# Patient Record
Sex: MALE | Race: WHITE | Employment: OTHER | URBAN - METROPOLITAN AREA
[De-identification: names, ages, dates, MRNs, and addresses within clinical notes are randomized per-mention and may not be internally consistent; named-entity substitution may affect disease eponyms.]

---

## 2022-07-21 ENCOUNTER — APPOINTMENT (OUTPATIENT)
Dept: GENERAL RADIOLOGY | Age: 87
DRG: 291 | End: 2022-07-21
Payer: MEDICARE

## 2022-07-21 ENCOUNTER — APPOINTMENT (OUTPATIENT)
Dept: CT IMAGING | Age: 87
DRG: 291 | End: 2022-07-21
Payer: MEDICARE

## 2022-07-21 ENCOUNTER — HOSPITAL ENCOUNTER (INPATIENT)
Age: 87
LOS: 7 days | Discharge: NURSING, REHAB OR LONG-TERM CARE | DRG: 291 | End: 2022-07-28
Attending: EMERGENCY MEDICINE | Admitting: INTERNAL MEDICINE
Payer: MEDICARE

## 2022-07-21 DIAGNOSIS — I48.91 ATRIAL FIBRILLATION, UNSPECIFIED TYPE (HCC): ICD-10-CM

## 2022-07-21 DIAGNOSIS — J95.811 POSTPROCEDURAL PNEUMOTHORAX: ICD-10-CM

## 2022-07-21 DIAGNOSIS — J81.0 ACUTE PULMONARY EDEMA (HCC): Primary | ICD-10-CM

## 2022-07-21 DIAGNOSIS — J96.01 ACUTE RESPIRATORY FAILURE WITH HYPOXIA (HCC): ICD-10-CM

## 2022-07-21 DIAGNOSIS — N17.9 AKI (ACUTE KIDNEY INJURY) (HCC): ICD-10-CM

## 2022-07-21 PROBLEM — I50.31 DIASTOLIC CHF, ACUTE (HCC): Status: ACTIVE | Noted: 2022-07-21

## 2022-07-21 LAB
AADO2: 342.9 MMHG
ALBUMIN SERPL-MCNC: 3.5 G/DL (ref 3.5–5.2)
ALP BLD-CCNC: 100 U/L (ref 40–129)
ALT SERPL-CCNC: 32 U/L (ref 0–40)
ANION GAP SERPL CALCULATED.3IONS-SCNC: 14 MMOL/L (ref 7–16)
AST SERPL-CCNC: 95 U/L (ref 0–39)
B.E.: -4.2 MMOL/L (ref -3–3)
BACTERIA: ABNORMAL /HPF
BASOPHILS ABSOLUTE: 0.03 E9/L (ref 0–0.2)
BASOPHILS RELATIVE PERCENT: 0.2 % (ref 0–2)
BILIRUB SERPL-MCNC: 1.3 MG/DL (ref 0–1.2)
BILIRUBIN URINE: ABNORMAL
BLOOD, URINE: ABNORMAL
BUN BLDV-MCNC: 38 MG/DL (ref 6–23)
CALCIUM SERPL-MCNC: 8.3 MG/DL (ref 8.6–10.2)
CHLORIDE BLD-SCNC: 110 MMOL/L (ref 98–107)
CLARITY: ABNORMAL
CO2: 19 MMOL/L (ref 22–29)
COHB: 0.3 % (ref 0–1.5)
COLOR: ABNORMAL
CREAT SERPL-MCNC: 2 MG/DL (ref 0.7–1.2)
CRITICAL: ABNORMAL
DATE ANALYZED: ABNORMAL
DATE OF COLLECTION: ABNORMAL
EKG ATRIAL RATE: 96 BPM
EKG Q-T INTERVAL: 280 MS
EKG QRS DURATION: 94 MS
EKG QTC CALCULATION (BAZETT): 402 MS
EKG R AXIS: 0 DEGREES
EKG T AXIS: 153 DEGREES
EKG VENTRICULAR RATE: 124 BPM
EOSINOPHILS ABSOLUTE: 0 E9/L (ref 0.05–0.5)
EOSINOPHILS RELATIVE PERCENT: 0 % (ref 0–6)
FIO2: 100 %
GFR AFRICAN AMERICAN: 38
GFR NON-AFRICAN AMERICAN: 32 ML/MIN/1.73
GLUCOSE BLD-MCNC: 148 MG/DL (ref 74–99)
GLUCOSE URINE: NEGATIVE MG/DL
HCO3: 20.6 MMOL/L (ref 22–26)
HCT VFR BLD CALC: 44.7 % (ref 37–54)
HEMOGLOBIN: 14.1 G/DL (ref 12.5–16.5)
HHB: 0.7 % (ref 0–5)
IMMATURE GRANULOCYTES #: 0.06 E9/L
IMMATURE GRANULOCYTES %: 0.5 % (ref 0–5)
KETONES, URINE: NEGATIVE MG/DL
LAB: ABNORMAL
LACTIC ACID: 2.7 MMOL/L (ref 0.5–2.2)
LEUKOCYTE ESTERASE, URINE: NEGATIVE
LYMPHOCYTES ABSOLUTE: 0.95 E9/L (ref 1.5–4)
LYMPHOCYTES RELATIVE PERCENT: 7.1 % (ref 20–42)
Lab: ABNORMAL
MCH RBC QN AUTO: 29.6 PG (ref 26–35)
MCHC RBC AUTO-ENTMCNC: 31.5 % (ref 32–34.5)
MCV RBC AUTO: 93.7 FL (ref 80–99.9)
METHB: 0.5 % (ref 0–1.5)
MODE: ABNORMAL
MONOCYTES ABSOLUTE: 0.88 E9/L (ref 0.1–0.95)
MONOCYTES RELATIVE PERCENT: 6.6 % (ref 2–12)
NEUTROPHILS ABSOLUTE: 11.38 E9/L (ref 1.8–7.3)
NEUTROPHILS RELATIVE PERCENT: 85.6 % (ref 43–80)
NITRITE, URINE: POSITIVE
O2 CONTENT: 20 ML/DL
O2 SATURATION: 99.3 % (ref 92–98.5)
O2HB: 98.5 % (ref 94–97)
OPERATOR ID: 1119
PATIENT TEMP: 37 C
PCO2: 37.2 MMHG (ref 35–45)
PDW BLD-RTO: 16.5 FL (ref 11.5–15)
PEEP/CPAP: 5 CMH2O
PFO2: 3.08 MMHG/%
PH BLOOD GAS: 7.36 (ref 7.35–7.45)
PH UA: 5.5 (ref 5–9)
PLATELET # BLD: 276 E9/L (ref 130–450)
PMV BLD AUTO: 10 FL (ref 7–12)
PO2: 307.9 MMHG (ref 75–100)
POTASSIUM REFLEX MAGNESIUM: 5 MMOL/L (ref 3.5–5)
PRO-BNP: ABNORMAL PG/ML (ref 0–450)
PROTEIN UA: 100 MG/DL
PS: 10 CMH20
RBC # BLD: 4.77 E12/L (ref 3.8–5.8)
RBC UA: ABNORMAL /HPF (ref 0–2)
RI(T): 1.11
SARS-COV-2, NAAT: NOT DETECTED
SODIUM BLD-SCNC: 143 MMOL/L (ref 132–146)
SOURCE, BLOOD GAS: ABNORMAL
SPECIFIC GRAVITY UA: >=1.03 (ref 1–1.03)
THB: 13.9 G/DL (ref 11.5–16.5)
TIME ANALYZED: 1755
TOTAL CK: 3244 U/L (ref 20–200)
TOTAL PROTEIN: 6.4 G/DL (ref 6.4–8.3)
TROPONIN, HIGH SENSITIVITY: 192 NG/L (ref 0–11)
UROBILINOGEN, URINE: 1 E.U./DL
WBC # BLD: 13.3 E9/L (ref 4.5–11.5)
WBC UA: ABNORMAL /HPF (ref 0–5)

## 2022-07-21 PROCEDURE — 2500000003 HC RX 250 WO HCPCS: Performed by: NURSE PRACTITIONER

## 2022-07-21 PROCEDURE — 6360000002 HC RX W HCPCS: Performed by: NURSE PRACTITIONER

## 2022-07-21 PROCEDURE — 96375 TX/PRO/DX INJ NEW DRUG ADDON: CPT

## 2022-07-21 PROCEDURE — 84484 ASSAY OF TROPONIN QUANT: CPT

## 2022-07-21 PROCEDURE — 36415 COLL VENOUS BLD VENIPUNCTURE: CPT

## 2022-07-21 PROCEDURE — 6360000002 HC RX W HCPCS: Performed by: EMERGENCY MEDICINE

## 2022-07-21 PROCEDURE — 2580000003 HC RX 258: Performed by: NURSE PRACTITIONER

## 2022-07-21 PROCEDURE — 81001 URINALYSIS AUTO W/SCOPE: CPT

## 2022-07-21 PROCEDURE — 6360000002 HC RX W HCPCS: Performed by: INTERNAL MEDICINE

## 2022-07-21 PROCEDURE — 87088 URINE BACTERIA CULTURE: CPT

## 2022-07-21 PROCEDURE — 82805 BLOOD GASES W/O2 SATURATION: CPT

## 2022-07-21 PROCEDURE — 85025 COMPLETE CBC W/AUTO DIFF WBC: CPT

## 2022-07-21 PROCEDURE — 80053 COMPREHEN METABOLIC PANEL: CPT

## 2022-07-21 PROCEDURE — 94660 CPAP INITIATION&MGMT: CPT

## 2022-07-21 PROCEDURE — 82550 ASSAY OF CK (CPK): CPT

## 2022-07-21 PROCEDURE — 93005 ELECTROCARDIOGRAM TRACING: CPT | Performed by: EMERGENCY MEDICINE

## 2022-07-21 PROCEDURE — 99285 EMERGENCY DEPT VISIT HI MDM: CPT

## 2022-07-21 PROCEDURE — 2000000000 HC ICU R&B

## 2022-07-21 PROCEDURE — 70450 CT HEAD/BRAIN W/O DYE: CPT

## 2022-07-21 PROCEDURE — 99223 1ST HOSP IP/OBS HIGH 75: CPT | Performed by: INTERNAL MEDICINE

## 2022-07-21 PROCEDURE — 96365 THER/PROPH/DIAG IV INF INIT: CPT

## 2022-07-21 PROCEDURE — 87449 NOS EACH ORGANISM AG IA: CPT

## 2022-07-21 PROCEDURE — 87635 SARS-COV-2 COVID-19 AMP PRB: CPT

## 2022-07-21 PROCEDURE — 2500000003 HC RX 250 WO HCPCS: Performed by: EMERGENCY MEDICINE

## 2022-07-21 PROCEDURE — 51702 INSERT TEMP BLADDER CATH: CPT

## 2022-07-21 PROCEDURE — 83605 ASSAY OF LACTIC ACID: CPT

## 2022-07-21 PROCEDURE — 71045 X-RAY EXAM CHEST 1 VIEW: CPT

## 2022-07-21 PROCEDURE — 2580000003 HC RX 258: Performed by: INTERNAL MEDICINE

## 2022-07-21 PROCEDURE — 83880 ASSAY OF NATRIURETIC PEPTIDE: CPT

## 2022-07-21 RX ORDER — ALBUMIN (HUMAN) 12.5 G/50ML
25 SOLUTION INTRAVENOUS EVERY 8 HOURS
Status: DISCONTINUED | OUTPATIENT
Start: 2022-07-21 | End: 2022-07-21

## 2022-07-21 RX ORDER — ONDANSETRON 2 MG/ML
4 INJECTION INTRAMUSCULAR; INTRAVENOUS EVERY 6 HOURS PRN
Status: DISCONTINUED | OUTPATIENT
Start: 2022-07-21 | End: 2022-07-28 | Stop reason: HOSPADM

## 2022-07-21 RX ORDER — ACETAMINOPHEN 650 MG/1
650 SUPPOSITORY RECTAL EVERY 6 HOURS PRN
Status: DISCONTINUED | OUTPATIENT
Start: 2022-07-21 | End: 2022-07-28 | Stop reason: HOSPADM

## 2022-07-21 RX ORDER — SODIUM CHLORIDE 9 MG/ML
INJECTION, SOLUTION INTRAVENOUS PRN
Status: DISCONTINUED | OUTPATIENT
Start: 2022-07-21 | End: 2022-07-28 | Stop reason: HOSPADM

## 2022-07-21 RX ORDER — POLYETHYLENE GLYCOL 3350 17 G/17G
17 POWDER, FOR SOLUTION ORAL DAILY PRN
Status: DISCONTINUED | OUTPATIENT
Start: 2022-07-21 | End: 2022-07-28 | Stop reason: HOSPADM

## 2022-07-21 RX ORDER — DOXYCYCLINE HYCLATE 100 MG/1
100 CAPSULE ORAL EVERY 12 HOURS SCHEDULED
Status: DISCONTINUED | OUTPATIENT
Start: 2022-07-21 | End: 2022-07-21

## 2022-07-21 RX ORDER — SODIUM CHLORIDE 0.9 % (FLUSH) 0.9 %
5-40 SYRINGE (ML) INJECTION EVERY 12 HOURS SCHEDULED
Status: DISCONTINUED | OUTPATIENT
Start: 2022-07-21 | End: 2022-07-28 | Stop reason: HOSPADM

## 2022-07-21 RX ORDER — FUROSEMIDE 10 MG/ML
20 INJECTION INTRAMUSCULAR; INTRAVENOUS EVERY 12 HOURS
Status: DISCONTINUED | OUTPATIENT
Start: 2022-07-22 | End: 2022-07-24

## 2022-07-21 RX ORDER — ENOXAPARIN SODIUM 100 MG/ML
30 INJECTION SUBCUTANEOUS 2 TIMES DAILY
Status: DISCONTINUED | OUTPATIENT
Start: 2022-07-21 | End: 2022-07-22

## 2022-07-21 RX ORDER — NITROGLYCERIN 20 MG/100ML
5-200 INJECTION INTRAVENOUS CONTINUOUS
Status: DISCONTINUED | OUTPATIENT
Start: 2022-07-21 | End: 2022-07-22

## 2022-07-21 RX ORDER — SODIUM CHLORIDE 0.9 % (FLUSH) 0.9 %
5-40 SYRINGE (ML) INJECTION PRN
Status: DISCONTINUED | OUTPATIENT
Start: 2022-07-21 | End: 2022-07-28 | Stop reason: HOSPADM

## 2022-07-21 RX ORDER — FUROSEMIDE 10 MG/ML
40 INJECTION INTRAMUSCULAR; INTRAVENOUS EVERY 8 HOURS
Status: DISCONTINUED | OUTPATIENT
Start: 2022-07-21 | End: 2022-07-21

## 2022-07-21 RX ORDER — ONDANSETRON 4 MG/1
4 TABLET, ORALLY DISINTEGRATING ORAL EVERY 8 HOURS PRN
Status: DISCONTINUED | OUTPATIENT
Start: 2022-07-21 | End: 2022-07-28 | Stop reason: HOSPADM

## 2022-07-21 RX ORDER — ACETAMINOPHEN 325 MG/1
650 TABLET ORAL EVERY 6 HOURS PRN
Status: DISCONTINUED | OUTPATIENT
Start: 2022-07-21 | End: 2022-07-28 | Stop reason: HOSPADM

## 2022-07-21 RX ORDER — FUROSEMIDE 10 MG/ML
80 INJECTION INTRAMUSCULAR; INTRAVENOUS ONCE
Status: COMPLETED | OUTPATIENT
Start: 2022-07-21 | End: 2022-07-21

## 2022-07-21 RX ADMIN — Medication 10 ML: at 21:54

## 2022-07-21 RX ADMIN — DEXTROSE MONOHYDRATE 75 MG: 50 INJECTION, SOLUTION INTRAVENOUS at 21:34

## 2022-07-21 RX ADMIN — FUROSEMIDE 80 MG: 10 INJECTION, SOLUTION INTRAMUSCULAR; INTRAVENOUS at 16:34

## 2022-07-21 RX ADMIN — AMIODARONE HYDROCHLORIDE 1 MG/MIN: 50 INJECTION, SOLUTION INTRAVENOUS at 21:48

## 2022-07-21 RX ADMIN — DOXYCYCLINE 100 MG: 100 INJECTION, POWDER, LYOPHILIZED, FOR SOLUTION INTRAVENOUS at 22:14

## 2022-07-21 RX ADMIN — WATER 1000 MG: 1 INJECTION INTRAMUSCULAR; INTRAVENOUS; SUBCUTANEOUS at 21:43

## 2022-07-21 RX ADMIN — NITROGLYCERIN 20 MCG/MIN: 20 INJECTION INTRAVENOUS at 16:32

## 2022-07-21 RX ADMIN — ENOXAPARIN SODIUM 30 MG: 100 INJECTION SUBCUTANEOUS at 21:51

## 2022-07-21 ASSESSMENT — PAIN SCALES - GENERAL
PAINLEVEL_OUTOF10: 0

## 2022-07-21 ASSESSMENT — ENCOUNTER SYMPTOMS: TACHYPNEA: 1

## 2022-07-21 ASSESSMENT — PAIN - FUNCTIONAL ASSESSMENT: PAIN_FUNCTIONAL_ASSESSMENT: NONE - DENIES PAIN

## 2022-07-21 NOTE — LETTER
[address] Medicaid  CERTIFICATION OF NECESSITY  FOR TRANSPORTATION   BY WHEELCHAIR VAN     Individual Information   1. Name: Narcisa Hardy 2. [place] Medicaid Billing Number: [id number]   9. Address: [address] [place] [address]      Transportation Provider Information   4. Provider Name:    5. [place] Medicaid Provider Number:  National Provider Identifier (NPI):      Certification  7. Criteria:  By signing this document, the practitioner certifies that two statements are true:  A. This individual must be accompanied by a mobility-related assistive device from the point of pick-up to the point of drop-off. B. Transport of this individual by standard passenger vehicle or common carrier is precluded or contraindicated. 8. Period Beginning Date:   5. Length  [x] Not more than 1 day(s)  [] One Year     Additional Information Relevant to Certification   10. Comments or Explanations, If Necessary or Appropriate   Respiratory failure, CHF,      Certifying Practitioner Information   11. Name of Practitioner: Johnna Oconnell   12. [place] Medicaid Provider Number, If Applicable:  [address] Provider Identifier (NPI):      Signature Information   14. Date of Signature: 7/25/2022 15. Name of Person Signing: CUONG Forte   16. Signature and Professional Designation: Electronically signed by CUONG Forte on 7/25/2022 at 12:08 PM       [facility] [phone]  Rev. 7/2015         [facility] Encounter Date/Time: 7/21/2022 [address] Account: [de-identified]    MRN: [id number]    Patient: Narcisa Hardy    Contact Serial #: [id number]      ENCOUNTER          Patient Class: I Private Enc?   No Unit  BD: [address] ICU IC09/IC09-01   Hospital Service: MED   Encounter DX: Acute pulmonary edema (H*   ADM Provider: Kianna Ortiz MD   Procedure:     ATT Provider: Kianna Ortiz MD   REF Provider:        Admission DX: Acute pulmonary edema ([facility] Diastolic CHF, acute ([facility] Acute respiratory failure with hypoxia [facility]), Atrial fibrillation, unspecified type ([facility] and DX codes: J81.0, I50.31, J96.01, I48.91      PATIENT                 Name: Cornelius Pulliam : 1933 (88 yrs)   Address: [place] Sex: Male   [place]: [address] [place] [address]         Marital Status:    Employer: RETIRED         Confucianism: None   Primary Care Provider:           Primary Phone: [phone]   EMERGENCY CONTACT   Contact Name Legal Guardian? Relationship to Patient Home Phone Work Phone   1. Sriram Mcnamara  2. Betzy Sees No  No Child  Daughter-in-Law [phone]                 GUARANTOR            Guarantor: Cornelius Pulliam     : 1933   Address: [address] Sex: Male     [address] [address]     Relation to Patient: Self       Home Phone: [phone]   Guarantor ID: [id number]       Work Phone:     Guarantor Employer: RETIRED         Status: RETIRED      COVERAGE        PRIMARY INSURANCE   Payor: [facility] MEDICARE Plan: ANTHEM MEDIBLUE [place]*   Payor Address: [facility] [id number] [place] [address]       Group Number: [id number] Type: INDEMNITY   Subscriber Name: Garry Junens Subscriber : 1933   Subscriber ID: [id number] Lola Palomo. Rel. to Sub: Self   SECONDARY INSURANCE   Payor:   Plan:     Payor Address: ,          Group Number:   Insurance Type:     Subscriber Name:   Subscriber :     Subscriber ID:   Pat.  Rel. to Sub:             CSN: [id number]

## 2022-07-21 NOTE — ED PROVIDER NOTES
HPI:  7/21/22, Time: 2:02 PM EDT         Kalpana Lozano is a 80 y.o. male presenting to the ED for patient brought in by ambulance reportedly found on the floor at home unresponsive and hypoxic. Unclear what happened no one to provide any history unknown past medical history. Unknown how long he had been down. Patient was unable to give any useful history        Review of Systems: Unavailable        --------------------------------------------- PAST HISTORY ---------------------------------------------  Past Medical History:  has no past medical history on file. Past Surgical History:  has no past surgical history on file. Social History:      Family History: family history is not on file. The patients home medications have been reviewed. Allergies: Patient has no known allergies.     -------------------------------------------------- RESULTS -------------------------------------------------  All laboratory and radiology results have been personally reviewed by myself   LABS:  Results for orders placed or performed during the hospital encounter of 07/21/22   SARS-CoV-2 NAAT (Rapid)    Specimen: Nasopharyngeal Swab   Result Value Ref Range    SARS-CoV-2, NAAT Not Detected Not Detected   CBC with Auto Differential   Result Value Ref Range    WBC 13.3 (H) 4.5 - 11.5 E9/L    RBC 4.77 3.80 - 5.80 E12/L    Hemoglobin 14.1 12.5 - 16.5 g/dL    Hematocrit 44.7 37.0 - 54.0 %    MCV 93.7 80.0 - 99.9 fL    MCH 29.6 26.0 - 35.0 pg    MCHC 31.5 (L) 32.0 - 34.5 %    RDW 16.5 (H) 11.5 - 15.0 fL    Platelets 573 060 - 474 E9/L    MPV 10.0 7.0 - 12.0 fL    Neutrophils % 85.6 (H) 43.0 - 80.0 %    Immature Granulocytes % 0.5 0.0 - 5.0 %    Lymphocytes % 7.1 (L) 20.0 - 42.0 %    Monocytes % 6.6 2.0 - 12.0 %    Eosinophils % 0.0 0.0 - 6.0 %    Basophils % 0.2 0.0 - 2.0 %    Neutrophils Absolute 11.38 (H) 1.80 - 7.30 E9/L    Immature Granulocytes # 0.06 E9/L    Lymphocytes Absolute 0.95 (L) 1.50 - 4.00 E9/L    Monocytes Absolute 0.88 0.10 - 0.95 E9/L    Eosinophils Absolute 0.00 (L) 0.05 - 0.50 E9/L    Basophils Absolute 0.03 0.00 - 0.20 E9/L   Comprehensive Metabolic Panel w/ Reflex to MG   Result Value Ref Range    Sodium 143 132 - 146 mmol/L    Potassium reflex Magnesium 5.0 3.5 - 5.0 mmol/L    Chloride 110 (H) 98 - 107 mmol/L    CO2 19 (L) 22 - 29 mmol/L    Anion Gap 14 7 - 16 mmol/L    Glucose 148 (H) 74 - 99 mg/dL    BUN 38 (H) 6 - 23 mg/dL    Creatinine 2.0 (H) 0.7 - 1.2 mg/dL    GFR Non-African American 32 >=60 mL/min/1.73    GFR African American 38     Calcium 8.3 (L) 8.6 - 10.2 mg/dL    Total Protein 6.4 6.4 - 8.3 g/dL    Albumin 3.5 3.5 - 5.2 g/dL    Total Bilirubin 1.3 (H) 0.0 - 1.2 mg/dL    Alkaline Phosphatase 100 40 - 129 U/L    ALT 32 0 - 40 U/L    AST 95 (H) 0 - 39 U/L   Troponin   Result Value Ref Range    Troponin, High Sensitivity 192 (H) 0 - 11 ng/L   Brain Natriuretic Peptide   Result Value Ref Range    Pro-BNP 10,854 (H) 0 - 450 pg/mL   Urinalysis with Microscopic   Result Value Ref Range    Color, UA DKYELLOW Straw/Yellow    Clarity, UA SLCLOUDY Clear    Glucose, Ur Negative Negative mg/dL    Bilirubin Urine SMALL (A) Negative    Ketones, Urine Negative Negative mg/dL    Specific Gravity, UA >=1.030 1.005 - 1.030    Blood, Urine LARGE (A) Negative    pH, UA 5.5 5.0 - 9.0    Protein,  (A) Negative mg/dL    Urobilinogen, Urine 1.0 <2.0 E.U./dL    Nitrite, Urine POSITIVE (A) Negative    Leukocyte Esterase, Urine Negative Negative    WBC, UA 2-5 0 - 5 /HPF    RBC, UA 2-5 0 - 2 /HPF    Bacteria, UA MODERATE (A) None Seen /HPF   Lactic Acid   Result Value Ref Range    Lactic Acid 2.7 (H) 0.5 - 2.2 mmol/L   CK   Result Value Ref Range    Total CK 3,244 (H) 20 - 200 U/L   EKG 12 Lead   Result Value Ref Range    Ventricular Rate 124 BPM    Atrial Rate 96 BPM    QRS Duration 94 ms    Q-T Interval 280 ms    QTc Calculation (Bazett) 402 ms    R Axis 0 degrees    T Axis 153 degrees       RADIOLOGY:  Interpreted by Radiologist.  CT Head WO Contrast   Final Result   1. No acute intracranial abnormality. 2. Chronic small vessel ischemic disease and generalized cerebral volume loss. 3. Rather diffuse scalp swelling, right greater than left. XR CHEST PORTABLE   Final Result   Cardiomegaly with pulmonary edema and bilateral small pleural effusions   suggestive of congestive heart failure acute exacerbation. Superimposed   pneumonia, particularly in the left lung base is not excluded. ------------------------- NURSING NOTES AND VITALS REVIEWED ---------------------------   The nursing notes within the ED encounter and vital signs as below have been reviewed. /85   Pulse (!) 104   Temp 98.4 °F (36.9 °C)   Resp 23   Wt 285 lb (129.3 kg)   SpO2 98%   Oxygen Saturation Interpretation: Abnormal      ---------------------------------------------------PHYSICAL EXAM--------------------------------------      Constitutional/General: Obtunded opens eyes to voice. Head: Normocephalic and atraumatic  Eyes: PERRL, disconjugate gaze   mouth: Oropharynx clear, handling secretions, no trismus  Neck: Supple, full ROM,   Pulmonary: Lungs coarse rhonchi bilaterally mildly tachypneic   cardiovascular:  Regular rate and rhythm, no murmurs, gallops, or rubs. 2+ distal pulses  Abdomen: Soft, non tender, non distended,   Extremities: Warm and well perfused about obvious deformity  Skin: warm and dry without rash.   Chronic skin changes to the legs  Neurologic: Obtunded opens eyes to voice       ------------------------------ ED COURSE/MEDICAL DECISION MAKING----------------------  Medications   nitroGLYCERIN 50 mg in dextrose 5% 250 mL infusion (20 mcg/min IntraVENous New Bag 7/21/22 1632)   furosemide (LASIX) injection 80 mg (80 mg IntraVENous Given 7/21/22 1634)         ED COURSE:  ED Course as of 07/21/22 1716   Thu Jul 21, 2022   1405 EKG: Atrial fibrillation at 124 nonspecific T wave changes intervals [GJ] 5 Son is now at the bedside he says patient was last seen in the last couple of days. Other than cardiac bypass many years ago he has been pretty healthy. Also discussed his regarding intubation and aggressive resuscitation and patient would like to be DNR DNI. [GJ]   3062 Discussed with hospitalist will discuss with ICU service. [GJ]      ED Course User Index  [GJ] Haley Burroughs MD       Medical Decision Making:    Found at home on the floor with altered mental status hypoxia tachypnea. Labs cultures CT and reassess. Counseling: The emergency provider has spoken with the family member patient and son and discussed todays results, in addition to providing specific details for the plan of care and counseling regarding the diagnosis and prognosis. Questions are answered at this time and they are agreeable with the plan.      --------------------------------- IMPRESSION AND DISPOSITION ---------------------------------    IMPRESSION  1. Acute pulmonary edema (HCC)    2. Acute respiratory failure with hypoxia (HCC)    3. Atrial fibrillation, unspecified type ([facility]        DISPOSITION  Disposition: Admit to CCU/ICU  Patient condition is serious      NOTE: This report was transcribed using voice recognition software.  Every effort was made to ensure accuracy; however, inadvertent computerized transcription errors may be present       Haley Burroughs MD  07/21/22 0994

## 2022-07-21 NOTE — H&P
[address] [place], [place] [address]ist Group   History and Physical      CHIEF COMPLAINT:  unresponsiveness    History of Present Illness:  80 y.o. male with a history of CAD s/p CABG presents with unresponsiveness. Initially in the ER no history was able to be obtained. Later son came in and helped with the following. Patient is socially active. He was last seen by the son last week however was seen by his friends 1 or 2 days ago. He was found on the floor at his home. He was hypoxic. When he presented into the emergency room the emergency room doctor said that he was satting in the 70%. BiPAP was placed approximately 1 to 2 hours prior to my assessment of the patient. He had not been responsive or coherent prior to that. The son discussed CODE STATUS with the ED doctor so the patient is wishing to be a DNR DNI  We are still not able to obtain details as mentioned above and below. During my assessment the son and other family members apparently have left the department    Informant(s) for H&P: chart, ed doc    REVIEW OF SYSTEMS:  no fevers, chills, cp, sob, n/v, ha, vision/hearing changes, wt changes, hot/cold flashes, other open skin lesions, diarrhea, constipation, dysuria/hematuria unless noted in HPI. Complete ROS performed with the patient and is otherwise negative. PMH:  No past medical history on file. Surgical History:  No past surgical history on file. Medications Prior to Admission:    Prior to Admission medications    Not on File       Allergies:    Patient has no known allergies. Social History:          Family History:   family history is not on file.      PHYSICAL EXAM:  Vitals:  /85   Pulse (!) 104   Temp 98.4 °F (36.9 °C)   Resp 23   Wt 285 lb (129.3 kg)   SpO2 98%     General Appearance: alert and oriented to person, place and time and in no acute distress  Skin: warm and dry  Head: normocephalic and atraumatic  Eyes: pupils equal, round, and reactive to light, extraocular eye movements intact, conjunctivae normal  Neck: neck supple and non tender without mass   Pulmonary/Chest:ilaterally rales , normal air movement, no respiratory distress  Cardiovascular: normal rate, normal S1 and S2 and no carotid bruits  Abdomen: soft, non-tender, non-distended, normal bowel sounds, no masses or organomegaly  Extremities: no cyanosis, no clubbing   Kniffen anasarca  Neurologic: no cranial nerve deficit Limited exam since he is weak and confused. He does move all 4 extremities but very weakly. He does arouse but does not converse very well. He does say that he feels okay. Not able to answer any other questions    LABS:  Recent Labs     07/21/22  1410      K 5.0   *   CO2 19*   BUN 38*   CREATININE 2.0*   GLUCOSE 148*   CALCIUM 8.3*       Recent Labs     07/21/22  1410   WBC 13.3*   RBC 4.77   HGB 14.1   HCT 44.7   MCV 93.7   MCH 29.6   MCHC 31.5*   RDW 16.5*      MPV 10.0       No results for input(s): POCGLU in the last 72 hours. Radiology: CT Head WO Contrast    Result Date: 7/21/2022  EXAMINATION: CT OF THE HEAD WITHOUT CONTRAST  7/21/2022 4:44 pm TECHNIQUE: CT of the head was performed without the administration of intravenous contrast. Automated exposure control, iterative reconstruction, and/or weight based adjustment of the mA/kV was utilized to reduce the radiation dose to as low as reasonably achievable. COMPARISON: None. HISTORY: ORDERING SYSTEM PROVIDED HISTORY: ams TECHNOLOGIST PROVIDED HISTORY: Reason for exam:->[facility] Has a \"code stroke\" or \"stroke alert\" been called? ->No Decision Support Exception - unselect if not a suspected or confirmed emergency medical condition->Emergency Medical Condition (MA) FINDINGS: There is patchy and confluent hypoattenuation within the white matter of the bilateral cerebral hemispheres. There is no evidence of mass, mass effect, or midline shift.   There is enlargement of the ventricles and sulci suggesting generalized cerebral volume loss. No extra-axial fluid collections or acute hemorrhage. The gray-white differentiation appears preserved without evidence of acute cortical ischemia. The calvarium is intact. There is minimal scattered mucosal thickening within the paranasal sinuses. The mastoid air cells are clear. There is rather diffuse scalp swelling, right greater than left. There are bilateral lens replacements. 1. No acute intracranial abnormality. 2. Chronic small vessel ischemic disease and generalized cerebral volume loss. 3. Rather diffuse scalp swelling, right greater than left. XR CHEST PORTABLE    Result Date: 7/21/2022  EXAMINATION: ONE XRAY VIEW OF THE CHEST 7/21/2022 12:38 pm COMPARISON: None. HISTORY: ORDERING SYSTEM PROVIDED HISTORY: sob TECHNOLOGIST PROVIDED HISTORY: Reason for exam:->sob FINDINGS: Marked cardiomegaly is noted. There is increased prominence of the central vasculature with increased interstitial markings diffusely. Mid to lower lung zone hazy airspace opacity is seen. Blunting of the bilateral costophrenic angles is noted. Postsurgical changes of the chest are seen. No acute osseous abnormality. Cardiomegaly with pulmonary edema and bilateral small pleural effusions suggestive of congestive heart failure acute exacerbation. Superimposed pneumonia, particularly in the left lung base is not excluded. EKG: a fib rvr 120's    ASSESSMENT:      Active Problems:    * No active hospital problems. *  Resolved Problems:    * No resolved hospital problems. *      PLAN:    Acute decompensated systolic heart failure causing acute respiratory failure. Clinically responding well to BiPAP. During his ER course he was also given a Oneil on nitro drip a dose of Lasix 80 mg IV. He has put out very dark less and at the time of this dictation ED nurse says total of 800 mL . Also hr decreased to ~100.  E.r. doctor spoke to intensivist approving ICU admission my plan is to wean off nitro. I will continue BiPAP. He is responding quite well to Lasix I will alter the dose and frequency under his labs and renal function and consider Lasix drip versus adding albumin. We can consider starting select home medications once they are confirmed. A. fib RVR rate improving supportive care should continue to improve it and then we could assess for antiarrhythmics I will consult cardiology and order 2D echo. Without an available record I cannot determine if this A. fib is new  Antonio:  actually will add albumin for osmotic renal protective effect. DNR DNI  DVT prophylaxis:    NOTE: This report was transcribed using voice recognition software. Every effort was made to ensure accuracy; however, inadvertent computerized transcription errors may be present.      Electronically signed by Edmundo Hdez MD on 7/21/2022 at 5:47 PM

## 2022-07-21 NOTE — PROGRESS NOTES
07/21/22 1656   NIV Type   NIV Started/Stopped On   Equipment Type V60   Mode Bilevel   Mask Type Full face mask   Mask Size Medium   Settings/Measurements   PIP Observed 10 cm H20   IPAP 10 cmH20   CPAP/EPAP 5 cmH2O   Rate Ordered 14   Resp 25   Insp Rise Time (%) 3 %   FiO2  100 %   I Time/ I Time % 0.9 s   Vt Exhaled 657 mL   Minute Volume 15.6 Liters   Mask Leak (lpm) 42 lpm   Date: 7/21/2022    Time: 4:57 PM    Patient Placed On BIPAP/CPAP/ Non-Invasive Ventilation? Yes    If no must comment. Facial area red/color change? No           If YES are Blister/Lesion present? No   If yes must notify nursing staff  BIPAP/CPAP skin barrier? Yes    Skin barrier type:mepilexlite       Comments:         Isadora Bernal RCP

## 2022-07-22 PROBLEM — J81.0 ACUTE PULMONARY EDEMA (HCC): Status: ACTIVE | Noted: 2022-07-22

## 2022-07-22 LAB
ANION GAP SERPL CALCULATED.3IONS-SCNC: 10 MMOL/L (ref 7–16)
B.E.: -2.1 MMOL/L (ref -3–3)
BUN BLDV-MCNC: 47 MG/DL (ref 6–23)
CALCIUM SERPL-MCNC: 7.8 MG/DL (ref 8.6–10.2)
CHLORIDE BLD-SCNC: 110 MMOL/L (ref 98–107)
CO2: 20 MMOL/L (ref 22–29)
CREAT SERPL-MCNC: 2 MG/DL (ref 0.7–1.2)
DELIVERY SYSTEMS: ABNORMAL
DEVICE: ABNORMAL
FIO2: 50
GFR AFRICAN AMERICAN: 38
GFR NON-AFRICAN AMERICAN: 32 ML/MIN/1.73
GLUCOSE BLD-MCNC: 139 MG/DL (ref 74–99)
HCO3: 23.2 MMOL/L (ref 22–26)
HCT VFR BLD CALC: 42.7 % (ref 37–54)
HEMOGLOBIN: 13.3 G/DL (ref 12.5–16.5)
L. PNEUMOPHILA SEROGP 1 UR AG: NORMAL
LACTIC ACID: 1.1 MMOL/L (ref 0.5–2.2)
LV EF: 40 %
LVEF MODALITY: NORMAL
MAGNESIUM: 2.2 MG/DL (ref 1.6–2.6)
MCH RBC QN AUTO: 29.8 PG (ref 26–35)
MCHC RBC AUTO-ENTMCNC: 31.1 % (ref 32–34.5)
MCV RBC AUTO: 95.5 FL (ref 80–99.9)
MODE: ABNORMAL
O2 SATURATION: 99.3 % (ref 92–98.5)
OPERATOR ID: 9689
PATIENT TEMP: 37
PCO2 (TEMP CORRECTED): 40.6 MMHG (ref 35–45)
PDW BLD-RTO: 16.5 FL (ref 11.5–15)
PH (TEMPERATURE CORRECTED): 7.36 (ref 7.35–7.45)
PHOSPHORUS: 4.2 MG/DL (ref 2.5–4.5)
PLATELET # BLD: 218 E9/L (ref 130–450)
PMV BLD AUTO: 9.6 FL (ref 7–12)
PO2 (TEMP CORRECTED): 155.1 MMHG (ref 60–80)
POC SOURCE: ABNORMAL
POSITIVE END EXP PRESS: 5 CMH2O
POTASSIUM SERPL-SCNC: 4.2 MMOL/L (ref 3.5–5)
PRESSURE SUPPORT: 10 CMH2O
PROCALCITONIN: 0.36 NG/ML (ref 0–0.08)
RBC # BLD: 4.47 E12/L (ref 3.8–5.8)
SODIUM BLD-SCNC: 140 MMOL/L (ref 132–146)
STREP PNEUMONIAE ANTIGEN, URINE: NORMAL
TROPONIN, HIGH SENSITIVITY: 213 NG/L (ref 0–11)
TSH SERPL DL<=0.05 MIU/L-ACNC: 1.55 UIU/ML (ref 0.27–4.2)
WBC # BLD: 13.1 E9/L (ref 4.5–11.5)

## 2022-07-22 PROCEDURE — 93306 TTE W/DOPPLER COMPLETE: CPT

## 2022-07-22 PROCEDURE — 36410 VNPNXR 3YR/> PHY/QHP DX/THER: CPT

## 2022-07-22 PROCEDURE — 84484 ASSAY OF TROPONIN QUANT: CPT

## 2022-07-22 PROCEDURE — 6360000002 HC RX W HCPCS: Performed by: INTERNAL MEDICINE

## 2022-07-22 PROCEDURE — 94660 CPAP INITIATION&MGMT: CPT

## 2022-07-22 PROCEDURE — 2000000000 HC ICU R&B

## 2022-07-22 PROCEDURE — 84145 PROCALCITONIN (PCT): CPT

## 2022-07-22 PROCEDURE — 2500000003 HC RX 250 WO HCPCS: Performed by: NURSE PRACTITIONER

## 2022-07-22 PROCEDURE — 84100 ASSAY OF PHOSPHORUS: CPT

## 2022-07-22 PROCEDURE — 83605 ASSAY OF LACTIC ACID: CPT

## 2022-07-22 PROCEDURE — 82803 BLOOD GASES ANY COMBINATION: CPT

## 2022-07-22 PROCEDURE — 99232 SBSQ HOSP IP/OBS MODERATE 35: CPT | Performed by: INTERNAL MEDICINE

## 2022-07-22 PROCEDURE — C1751 CATH, INF, PER/CENT/MIDLINE: HCPCS

## 2022-07-22 PROCEDURE — 76937 US GUIDE VASCULAR ACCESS: CPT

## 2022-07-22 PROCEDURE — 2700000000 HC OXYGEN THERAPY PER DAY

## 2022-07-22 PROCEDURE — 2580000003 HC RX 258: Performed by: INTERNAL MEDICINE

## 2022-07-22 PROCEDURE — 83735 ASSAY OF MAGNESIUM: CPT

## 2022-07-22 PROCEDURE — 84443 ASSAY THYROID STIM HORMONE: CPT

## 2022-07-22 PROCEDURE — 80048 BASIC METABOLIC PNL TOTAL CA: CPT

## 2022-07-22 PROCEDURE — 6360000002 HC RX W HCPCS: Performed by: NURSE PRACTITIONER

## 2022-07-22 PROCEDURE — 05HA33Z INSERTION OF INFUSION DEVICE INTO LEFT BRACHIAL VEIN, PERCUTANEOUS APPROACH: ICD-10-PCS | Performed by: STUDENT IN AN ORGANIZED HEALTH CARE EDUCATION/TRAINING PROGRAM

## 2022-07-22 PROCEDURE — 6370000000 HC RX 637 (ALT 250 FOR IP): Performed by: INTERNAL MEDICINE

## 2022-07-22 PROCEDURE — 99223 1ST HOSP IP/OBS HIGH 75: CPT | Performed by: INTERNAL MEDICINE

## 2022-07-22 PROCEDURE — 85027 COMPLETE CBC AUTOMATED: CPT

## 2022-07-22 PROCEDURE — 2580000003 HC RX 258: Performed by: NURSE PRACTITIONER

## 2022-07-22 PROCEDURE — 2500000003 HC RX 250 WO HCPCS: Performed by: INTERNAL MEDICINE

## 2022-07-22 RX ORDER — SODIUM CHLORIDE 0.9 % (FLUSH) 0.9 %
5-40 SYRINGE (ML) INJECTION PRN
Status: DISCONTINUED | OUTPATIENT
Start: 2022-07-22 | End: 2022-07-28 | Stop reason: HOSPADM

## 2022-07-22 RX ORDER — SODIUM CHLORIDE 0.9 % (FLUSH) 0.9 %
5-40 SYRINGE (ML) INJECTION EVERY 12 HOURS SCHEDULED
Status: DISCONTINUED | OUTPATIENT
Start: 2022-07-22 | End: 2022-07-28 | Stop reason: HOSPADM

## 2022-07-22 RX ORDER — HEPARIN SODIUM (PORCINE) LOCK FLUSH IV SOLN 100 UNIT/ML 100 UNIT/ML
1 SOLUTION INTRAVENOUS EVERY 12 HOURS SCHEDULED
Status: DISCONTINUED | OUTPATIENT
Start: 2022-07-22 | End: 2022-07-28 | Stop reason: HOSPADM

## 2022-07-22 RX ORDER — HYDRALAZINE HYDROCHLORIDE 10 MG/1
10 TABLET, FILM COATED ORAL EVERY 8 HOURS SCHEDULED
Status: DISCONTINUED | OUTPATIENT
Start: 2022-07-22 | End: 2022-07-25

## 2022-07-22 RX ORDER — HEPARIN SODIUM (PORCINE) LOCK FLUSH IV SOLN 100 UNIT/ML 100 UNIT/ML
1 SOLUTION INTRAVENOUS PRN
Status: DISCONTINUED | OUTPATIENT
Start: 2022-07-22 | End: 2022-07-28 | Stop reason: HOSPADM

## 2022-07-22 RX ORDER — AMIODARONE HYDROCHLORIDE 200 MG/1
200 TABLET ORAL DAILY
Status: DISCONTINUED | OUTPATIENT
Start: 2022-07-23 | End: 2022-07-28 | Stop reason: HOSPADM

## 2022-07-22 RX ORDER — SODIUM CHLORIDE 9 MG/ML
INJECTION, SOLUTION INTRAVENOUS PRN
Status: DISCONTINUED | OUTPATIENT
Start: 2022-07-22 | End: 2022-07-28 | Stop reason: HOSPADM

## 2022-07-22 RX ORDER — CARVEDILOL 6.25 MG/1
6.25 TABLET ORAL 2 TIMES DAILY WITH MEALS
Status: DISCONTINUED | OUTPATIENT
Start: 2022-07-22 | End: 2022-07-24

## 2022-07-22 RX ADMIN — METOPROLOL TARTRATE 25 MG: 25 TABLET, FILM COATED ORAL at 13:13

## 2022-07-22 RX ADMIN — Medication 10 ML: at 08:36

## 2022-07-22 RX ADMIN — APIXABAN 2.5 MG: 2.5 TABLET, FILM COATED ORAL at 08:36

## 2022-07-22 RX ADMIN — DOXYCYCLINE 100 MG: 100 INJECTION, POWDER, LYOPHILIZED, FOR SOLUTION INTRAVENOUS at 14:21

## 2022-07-22 RX ADMIN — HEPARIN 100 UNITS: 100 SYRINGE at 22:41

## 2022-07-22 RX ADMIN — CARVEDILOL 6.25 MG: 6.25 TABLET, FILM COATED ORAL at 16:45

## 2022-07-22 RX ADMIN — DOXYCYCLINE 100 MG: 100 INJECTION, POWDER, LYOPHILIZED, FOR SOLUTION INTRAVENOUS at 22:45

## 2022-07-22 RX ADMIN — APIXABAN 2.5 MG: 2.5 TABLET, FILM COATED ORAL at 21:21

## 2022-07-22 RX ADMIN — PIPERACILLIN AND TAZOBACTAM 3375 MG: 3; .375 INJECTION, POWDER, LYOPHILIZED, FOR SOLUTION INTRAVENOUS at 07:05

## 2022-07-22 RX ADMIN — Medication 10 ML: at 22:41

## 2022-07-22 RX ADMIN — PIPERACILLIN AND TAZOBACTAM 3375 MG: 3; .375 INJECTION, POWDER, LYOPHILIZED, FOR SOLUTION INTRAVENOUS at 16:50

## 2022-07-22 RX ADMIN — Medication 10 ML: at 13:14

## 2022-07-22 RX ADMIN — LIDOCAINE HYDROCHLORIDE 5 ML: 10 SOLUTION INTRAVENOUS at 13:37

## 2022-07-22 RX ADMIN — Medication 10 ML: at 21:22

## 2022-07-22 RX ADMIN — AMIODARONE HYDROCHLORIDE 0.5 MG/MIN: 50 INJECTION, SOLUTION INTRAVENOUS at 04:00

## 2022-07-22 ASSESSMENT — PAIN SCALES - GENERAL: PAINLEVEL_OUTOF10: 0

## 2022-07-22 NOTE — CONSULTS
Inpatient CARDIOLOGY Consultation        Reason for Consult:  CHF    Date of Consultation: 7/22/2022    Patient previously not known to [id number] [place] [address]. HISTORY OF PRESENT ILLNESS: 80year old with history of CAD s/p CABG  more than 20 yrs ago presented for \"unresponsiveness\". HPI from chart, his son and patient. He ws found unresponsive at home, in the ER his O2 Saturations low, in the 70%. BiPAP was placed approximately 1 to 2 hours prior to my assessment of the patient. He had not been responsive or coherent prior to that. Admitted and Cardiology consulted for CHF and elevated Troponin. Currently he is alert, son at bed side, No CP. He did not se an ycardioogist since bypass. Just take aspirin at home. No Hx of  syncope. Follows with Dr Cecile Hebert, seen about year ago for \"leg swelling and dermatitis\". HPI from EMR and hisn son    REVIEW OF SYSTEMS:   Review of rest of 12 systems negative except as mentioned in HPI. Constitutional: Denies fatigue, fevers, chills or night sweats  Eyes: Denies visual changes or drainage  ENT: Denies headaches or hearing loss. No sore throat. No epistaxis   Cardiovascular: Denies chest pain, pressure or palpitations. No lower extremity swelling. No orthopnea. Respiratory: Denies LENNON, cough, no hemoptysis   Gastrointestinal: Denies nausea, vomiting, hematemesis or melena    Genitourinary: Denies urgency, dysuria or hematuria. Musculoskeletal: Denies gait disturbance, weakness   Integumentary: Denies rash  Neurological: Denies dizziness, headaches or seizures. No numbness or tingling  Psychiatric: Denies anxiety   Endocrine: Denies temperature intolerance. Hematologic/Lymphatic: Denies abnormal bruising or bleeding. N    Please note: past medical records were reviewed per electronic medical record (EMR) - see detailed reports under Past Medical/ Surgical History. Past Medical History:    CAD  Obesity  Edema  Stasis dermatitis    Past Surgical History:    S/p CABG >20 years ago      Allergies:    Patient has no known allergies. Social History:    Social History     Socioeconomic History    Marital status:      Spouse name: Not on file    Number of children: Not on file    Years of education: Not on file    Highest education level: Not on file   Occupational History    Not on file   Tobacco Use    Smoking status: Not on file    Smokeless tobacco: Not on file   Substance and Sexual Activity    Alcohol use: Not on file    Drug use: Not on file    Sexual activity: Not on file   Other Topics Concern    Not on file   Social History Narrative    Not on file     Social Determinants of Health     Financial Resource Strain: Not on file   Food Insecurity: Not on file   Transportation Needs: Not on file   Physical Activity: Not on file   Stress: Not on file   Social Connections: Not on file   Intimate Partner Violence: Not on file   Housing Stability: Not on file       Family History:   No family history on file.       Medications Prior to admit: Reviewed  Prior to Admission medications    Not on File         DATA:      ECG - Reviewed     Tele strips: Reviewed    Diagnostic:      Intake/Output Summary (Last 24 hours) at 7/22/2022 0910  Last data filed at 7/22/2022 0400  Gross per 24 hour   Intake --   Output 750 ml   Net -750 ml       IMAGING STUDIES: Reviewed    LABS:      Cardiac enzymes:  Recent Labs     07/21/22  1410   CKTOTAL 3,244*     CBC:   Recent Labs     07/21/22  1410 07/22/22  0421   WBC 13.3* 13.1*   HGB 14.1 13.3   HCT 44.7 42.7    218     BMP:   Recent Labs     07/21/22  1410 07/22/22  0421    140   K 5.0 4.2   CO2 19* 20*   BUN 38* 47*   CREATININE 2.0* 2.0*   LABGLOM 32 32   CALCIUM 8.3* 7.8*     Mag:   Recent Labs     07/22/22  0421   MG 2.2     Phos:   Recent Labs     07/22/22 0421   PHOS 4.2     TSH:   Recent Labs     07/22/22 0421   TSH 1.550     HgA1c: No results found for: LABA1C  No results found for: EAG  proBNP:   Recent Labs     07/21/22  1410   PROBNP 10,854*     PT/INR: No results for input(s): PROTIME, INR in the last 72 hours. APTT:No results for input(s): APTT in the last 72 hours. FASTING LIPID PANEL:No results found for: CHOL, HDL, LDLDIRECT, LDLCALC, TRIG  LIVER PROFILE:  Recent Labs     07/21/22  1410   AST 95*   ALT 32   LABALBU 3.5       Current Inpatient Medications:   piperacillin-tazobactam  3,375 mg IntraVENous Q8H    apixaban  2.5 mg Oral BID    sodium chloride flush  5-40 mL IntraVENous 2 times per day    [Held by provider] furosemide  20 mg IntraVENous Q12H    doxycycline (VIBRAMYCIN) IV  100 mg IntraVENous Q12H       IV Infusions (if any):   nitroGLYCERIN 20 mcg/min (07/21/22 1632)    sodium chloride      amiodarone 0.5 mg/min (07/22/22 0400)       PHYSICAL EXAM:     /82   Pulse 100   Temp 97.6 °F (36.4 °C) (Axillary)   Resp 23   Ht 6' (1.829 m) Comment: Estimated  Wt 254 lb (115.2 kg)   SpO2 97%   BMI 34.45 kg/m²     CONST:  Well developed, appears stated age. Awake, alert and no apparent distress. On O2  HEENT:   Head- Normocephalic  Eyes- Conjunctivae pink, no icterus  Throat- Oral mucosa moist  Neck-  No stridor, no jugular venous distention. No carotid bruit. CHEST: Chest symmetrical and non-tender to palpation. No accessory muscle use  RESPIRATORY: Lung sounds - Few rhonchi  CARDIOVASCULAR:     Heart Inspection-  Heart Palpation- No thrills. Heart Ausculation- Irregularly irregular rate and rhythm, 2/6 systolic murmur. No s3 or rub   EXT: ++ lower extremity edema and chronic stasis dermatitis; Distal pulses palpable  ABDOMEN: Soft, non-tender to light palpation. Obese, Bowel sounds present. No abdominal bruit  MS: n/a  : Deferred  RECTAL: Deferred  SKIN: Warm and dry   NEURO / PSYCH: Oriented to person, place; Good mood and affect.         IMPRESSION / RECOMMENDATIONS:    Acute Heart Failure - Likely diastolic pending his Echo - Continue Lasix as his BP, renal Fn tolerates. Monitor daily Wts, I/Os, BP, renal Fn    Acute hypoxic respiratory failure - Per Pulmonary/Critical care    Elevated Troponin - Likely demand ischemia from hypoxic respiratory failure and Rhabdo - no CP, EKG reviewed - No further cardiac testing    A Fb with RVR - New. Rate control with Amiodarone and BB. Monitor BP and HR - Started on adjusted dose Eliquis for [address] -Risk benefits of [address] d/w him and his son -Agreeable    CAD s/p CABG - >20 yrs ago. Add low dose BB; Hold his home ASA due to Eliquis, No statin due to his age and he \"don't want to take it in the past\"     Delores vs CKD - Monitor renal Fn    Non-morbid obesity            No further cardiac testing due to his DNR status and advanced age.     Above recommendations discussed with him and his Son and all questions answered    D/w his nursing staff    D/w Dr Kami Solitario.      Electronically signed by Vianney Basilio MD on 7/22/2022 at [facility] Card[facility]

## 2022-07-22 NOTE — PROGRESS NOTES
Admitting Date and Time: 7/21/2022  1:41 PM  Admit Dx: Acute pulmonary edema (HCC) [C82.3]  Diastolic CHF, acute (HCC) [I50.31]  Acute respiratory failure with hypoxia (HCC) [J96.01]  Atrial fibrillation, unspecified type ([facility] [I48.91]    Subjective:    Pt feels tired  Per RN: no new issues    ROS: denies fever, chills, cp, sob, n/v, HA unless stated above.      piperacillin-tazobactam  3,375 mg IntraVENous Q8H    apixaban  2.5 mg Oral BID    sodium chloride flush  5-40 mL IntraVENous 2 times per day    heparin flush  1 mL IntraVENous 2 times per day    carvedilol  6.25 mg Oral BID WC    hydrALAZINE  10 mg Oral 3 times per day    [START ON 7/23/2022] amiodarone  200 mg Oral Daily    sodium chloride flush  5-40 mL IntraVENous 2 times per day    [Held by provider] furosemide  20 mg IntraVENous Q12H    doxycycline (VIBRAMYCIN) IV  100 mg IntraVENous Q12H     sodium chloride flush, 5-40 mL, PRN  sodium chloride, , PRN  heparin flush, 1 mL, PRN  sodium chloride flush, 5-40 mL, PRN  sodium chloride, , PRN  ondansetron, 4 mg, Q8H PRN   Or  ondansetron, 4 mg, Q6H PRN  polyethylene glycol, 17 g, Daily PRN  acetaminophen, 650 mg, Q6H PRN   Or  acetaminophen, 650 mg, Q6H PRN  perflutren lipid microspheres, 1.5 mL, ONCE PRN         Objective:    BP (!) 141/87   Pulse (!) 108   Temp 97.9 °F (36.6 °C) (Axillary)   Resp 19   Ht 6' (1.829 m) Comment: Estimated  Wt 254 lb (115.2 kg)   SpO2 96%   BMI 34.45 kg/m²   General Appearance: alert and oriented to person, place and time and in no acute distress  Skin: warm and dry  Head: normocephalic and atraumatic  Eyes: pupils equal, round, and reactive to light, extraocular eye movements intact, conjunctivae normal  Neck: neck supple and non tender without mass   Pulmonary/Chest: clear to auscultation bilaterally- no wheezes, rales or rhonchi, normal air movement, no respiratory distress  Cardiovascular: normal rate, normal S1 and S2 and no carotid bruits  Abdomen: soft, non-tender, non-distended, normal bowel sounds, no masses or organomegaly  Extremities: no cyanosis, no clubbing   Still significant anasarca  Neurologic: no cranial nerve deficit and speech normal      Recent Labs     07/21/22  1410 07/22/22  0421    140   K 5.0 4.2   * 110*   CO2 19* 20*   BUN 38* 47*   CREATININE 2.0* 2.0*   GLUCOSE 148* 139*   CALCIUM 8.3* 7.8*       Recent Labs     07/21/22  1410   ALKPHOS 100   PROT 6.4   LABALBU 3.5   BILITOT 1.3*   AST 95*   ALT 32       Recent Labs     07/21/22  1410 07/22/22  0421   WBC 13.3* 13.1*   RBC 4.77 4.47   HGB 14.1 13.3   HCT 44.7 42.7   MCV 93.7 95.5   MCH 29.6 29.8   MCHC 31.5* 31.1*   RDW 16.5* 16.5*    218   MPV 10.0 9.6           Radiology:   CT Head WO Contrast   Final Result   1. No acute intracranial abnormality. 2. Chronic small vessel ischemic disease and generalized cerebral volume loss. 3. Rather diffuse scalp swelling, right greater than left. XR CHEST PORTABLE   Final Result   Cardiomegaly with pulmonary edema and bilateral small pleural effusions   suggestive of congestive heart failure acute exacerbation. Superimposed   pneumonia, particularly in the left lung base is not excluded. XR CHEST PORTABLE    (Results Pending)       Assessment:  Principal Problem:    Diastolic CHF, acute ([address]  Resolved Problems:    * No resolved hospital problems. *      Plan: History of present illness from History and Physical:   80 y.o. male with a history of CAD s/p CABG presents with unresponsiveness. Initially in the ER no history was able to be obtained. Later son came in and helped with the following. Patient is socially active. He was last seen by the son last week however was seen by his friends 1 or 2 days ago. He was found on the floor at his home. He was hypoxic. When he presented into the emergency room the emergency room doctor said that he was satting in the 70%.   BiPAP was placed approximately 1 to 2 hours prior to my assessment of the patient. He had not been responsive or coherent prior to that. The son discussed CODE STATUS with the ED doctor so the patient is wishing to be a DNR DNI  We are still not able to obtain details as mentioned above and below. During my assessment the son and other family members apparently have left the department    During his ER course he was also given a Oneil on nitro drip a dose of Lasix 80 mg IV. 2D echo 7/22/2022:  Technically sub-optimal images. Normal left ventricular chamber size. Mild global hypokinesis, abnormal septal motion, LV EF 40 +/- 3%. Indeterminate LV diastolic function. Left atrium is moderately enlarged. Severely increased left atrial volume. Interatrial septum not well visualized but appears intact. Right ventricle enlarged with decreased function. Moderately enlarged right atrium. Moderate eccentric mitral regurgitation present. No mitral valve prolapse. No hemodynamically significant aortic stenosis. There is mild aortic regurgitation. There is mild to moderate tricuspid regurgitation. Moderate Pulmonary hypertension, RVSP 62mmHg. Normal aortic root size. No evidence of pericardial effusion. Pleural effusion noted. The inferior vena cava dilated with decreased respiratory variation. No intra cardiac mass or thrombus. No comparison study available. Acute decompensated systolic heart failure causing acute respiratory failure. Clinically responding well to BiPAP. Hold further  Lasix now. See #3. A. fib RVR rate. Antiarrhythmics adjusted by intensivist    Antonio:  no change in crt but inc in bun increase. Life threatening noncompliance. Only takes baby asa at home. 7/22: I thoroughly reasoned with him, empathesized with his concern about becoming a burden to his family. I explained how med compliance would increase his health and decrease chance of debility.  He understood but I question his cognitive abilities and insight. DVT prophylaxis: Eliquis     NOTE: This report was transcribed using voice recognition software. Every effort was made to ensure accuracy; however, inadvertent computerized transcription errors may be present.      Electronically signed by Khadar Guallpa MD on 7/22/2022 at 2:36 PM

## 2022-07-22 NOTE — CARE COORDINATION
Free 30 day trial card for Eliquis & instructions explained and given to patient and son. Patient instructed to take this card with  prescription to retail pharmacy to obtain 30 day supply for free. Instructed patient to follow-up with PCP within 1-2 weeks in order to obtain subsequent prescription for this medication at which time PCP will complete prior authorization if required. Patient  & son verbalized understanding. Electronically signed by Tina Madrid on 7/22/2022 at 11:06 AM    PCP list was also given to patient and son. Son said he would call. They were asked to let us know when an appt is made.  Electronically signed by Tina Madrid on 7/22/2022 at 11:07 AM

## 2022-07-22 NOTE — CONSULTS
Pulmonary/Critical Care Consult Note    CHIEF COMPLAINT: Fall, acute hypoxic respiratory failure secondary to pulmonary edema/possible superimposed bacterial pneumonia, decompensated congestive heart failure, new onset atrial fibrillation with RVR, and urinary tract infection. HISTORY OF PRESENT ILLNESS: Patient is a 42-year-old male with history of CHF. Patient was apparently found unresponsive on the floor by a friend who called EMS. Patient was found to be hypoxic and brought to the ED for further evaluation. Patient was apparently obtunded in the ED on presentation, however his mental status has improved on admission. Patient is somnolent but able to follow commands. He is a poor historian. Patient's son who was at bedside provided partial history and stated he last saw the patient on Sunday. It is not known how long the patient was down before being found. The patient also apparently hit his head when he fell. Patient was placed on BiPAP in the ED for acute hypoxic respiratory failure. Portable chest x-ray showed cardiomegaly, pulmonary edema/CHF, and possible superimposed bacterial pneumonia. He also received 80 mg of Lasix IV push and a nitroglycerin drip was initiated. Patient is a DNR CCA. CT head without contrast obtained in the ED prior to admission showed no acute abnormality. Chronic small vessel ischemia and generalized cerebral volume loss noted. Diffuse scalp edema noted. ALLERGY:  Patient has no known allergies. FAMILY HISTORY:  No family history on file. SOCIAL HISTORY:  Social History     Socioeconomic History    Marital status:       Spouse name: Not on file    Number of children: Not on file    Years of education: Not on file    Highest education level: Not on file   Occupational History    Not on file   Tobacco Use    Smoking status: Not on file    Smokeless tobacco: Not on file   Substance and Sexual Activity    Alcohol use: Not on file    Drug use: Not on file    Sexual activity: Not on file   Other Topics Concern    Not on file   Social History Narrative    Not on file     Social Determinants of Health     Financial Resource Strain: Not on file   Food Insecurity: Not on file   Transportation Needs: Not on file   Physical Activity: Not on file   Stress: Not on file   Social Connections: Not on file   Intimate Partner Violence: Not on file   Housing Stability: Not on file       MEDICAL HISTORY:  No past medical history on file. MEDICATIONS:   sodium chloride flush  5-40 mL IntraVENous 2 times per day    enoxaparin  30 mg SubCUTAneous BID    furosemide  40 mg IntraVENous Q8H    albumin human  25 g IntraVENous Q8H      nitroGLYCERIN 20 mcg/min (07/21/22 1632)    sodium chloride       sodium chloride flush, sodium chloride, ondansetron **OR** ondansetron, polyethylene glycol, acetaminophen **OR** acetaminophen, perflutren lipid microspheres    REVIEW OF SYSTEMS:  Unable to obtain accurate review of systems due to altered mental status.     PHYSICAL EXAM:  Vitals:    07/21/22 2014   BP: (!) 152/94   Pulse: (!) 107   Resp: 23   Temp: 98.6 °F (37 °C)   SpO2: 96%     FiO2 : (S) 50 %  O2 Flow Rate (L/min): 15 L/min  O2 Device: PAP (positive airway pressure)    Constitutional: No fever, chills, diaphoresis  HEENT: No head lesions, PERRL, EOMI, mouth without lesions, no nasal lesions, no cervical adenopathy palpated   Respiratory: Increased respiratory effort on BiPAP, lungs with equal breath sounds and coarse crackles bilaterally, no accessory muscle use   CV: Rapid rate and irregular rhythm, harsh pansystolic murmur noted, JVD, 3+ bilateral lower extremity edema with erythema and ulcerations  Abdomen: Soft, obese, non tender, + bowel sounds, no lesions   Skin: Adequate turgor, no rash, capillary refill <2 seconds   Extremities: Muscular strength 4/4 in 4 limbs, moves 4 limbs spontaneously, distal pulses present   Neurology: Somnolent but arouses easily with speech, oriented to to name only, PERRLA, gag intact, corneal reflex present, follows commands, moves 4 limbs on command and spontaneously, equal sensation, no dysmetria, neck is supple, no meningiticsigns present    LABS:  WBC   Date Value Ref Range Status   07/21/2022 13.3 (H) 4.5 - 11.5 E9/L Final     Hemoglobin   Date Value Ref Range Status   07/21/2022 14.1 12.5 - 16.5 g/dL Final     Hematocrit   Date Value Ref Range Status   07/21/2022 44.7 37.0 - 54.0 % Final     MCV   Date Value Ref Range Status   07/21/2022 93.7 80.0 - 99.9 fL Final     Platelets   Date Value Ref Range Status   07/21/2022 276 130 - 450 E9/L Final     Sodium   Date Value Ref Range Status   07/21/2022 143 132 - 146 mmol/L Final     Potassium reflex Magnesium   Date Value Ref Range Status   07/21/2022 5.0 3.5 - 5.0 mmol/L Final     Chloride   Date Value Ref Range Status   07/21/2022 110 (H) 98 - 107 mmol/L Final     CO2   Date Value Ref Range Status   07/21/2022 19 (L) 22 - 29 mmol/L Final     BUN   Date Value Ref Range Status   07/21/2022 38 (H) 6 - 23 mg/dL Final     Creatinine   Date Value Ref Range Status   07/21/2022 2.0 (H) 0.7 - 1.2 mg/dL Final     Glucose   Date Value Ref Range Status   07/21/2022 148 (H) 74 - 99 mg/dL Final     Calcium   Date Value Ref Range Status   07/21/2022 8.3 (L) 8.6 - 10.2 mg/dL Final     Total Protein   Date Value Ref Range Status   07/21/2022 6.4 6.4 - 8.3 g/dL Final     Albumin   Date Value Ref Range Status   07/21/2022 3.5 3.5 - 5.2 g/dL Final     Total Bilirubin   Date Value Ref Range Status   07/21/2022 1.3 (H) 0.0 - 1.2 mg/dL Final     Alkaline Phosphatase   Date Value Ref Range Status   07/21/2022 100 40 - 129 U/L Final     AST   Date Value Ref Range Status   07/21/2022 95 (H) 0 - 39 U/L Final     ALT   Date Value Ref Range Status   07/21/2022 32 0 - 40 U/L Final     GFR Non-   Date Value Ref Range Status   07/21/2022 32 >=60 mL/min/1.73 Final     Comment:     Chronic Kidney Disease: less than 60 ml/min/1.73 sq.m. Kidney Failure: less than 15 ml/min/1.73 sq.m. Results valid for patients 18 years and older. GFR    Date Value Ref Range Status   07/21/2022 38  Final     No results found for: MG  No results found for: PHOS  Recent Labs     07/21/22  1755   PH 7.362   PO2 307.9*   PCO2 37.2   HCO3 20.6*   BE -4.2*   O2SAT 99.3*       RADIOLOGY:  CT Head WO Contrast   Final Result   1. No acute intracranial abnormality. 2. Chronic small vessel ischemic disease and generalized cerebral volume loss. 3. Rather diffuse scalp swelling, right greater than left. XR CHEST PORTABLE   Final Result   Cardiomegaly with pulmonary edema and bilateral small pleural effusions   suggestive of congestive heart failure acute exacerbation. Superimposed   pneumonia, particularly in the left lung base is not excluded.              IMPRESSION:  Acute hypoxic respiratory failure secondary to pulmonary edema and possible superimposed bacterial pneumonia  Decompensated CHF  NSTEMI  New onset atrial fibrillation with RVR  Urinary tract infection  Acute metabolic encephalopathy-possibly secondary to urinary tract infection  CA  Non-anion gap metabolic acidosis  Chronic stasis dermatitis with ulcerations  Elevated CPK  Leukocytosis  Obesity-BMI 34.45        PLAN:  Continue BiPAP overnight  Continue nitroglycerin drip  Doxycycline and Zosyn  Urine culture, urine culture, Legionella, and strep antigens pending  Cardiology consulted  Echocardiogram  Trend troponins  Amiodarone drip  Cardiac telemetry  Monitor kidney function and avoid nephrotoxins  Wound care consult  Cardiac telemetry  Continuous pulse oximetry  Change Lovenox to Eliquis for atrial fibrillation      ATTESTATION:  ICU Staff Physician note of personal involvement in Care  As the attending physician, I certify that I personally reviewed the patients history and personally examined the patient to confirm the physical findings described above,  And that I reviewed the relevant imaging studies and available reports. I also discussed the differential diagnosis and all of the proposed management plans with the patient and individuals accompanying the patient to this visit. They had the opportunity to ask questions about the proposed management plans and to have those questions answered. This patient has a high probability of sudden, clinically significant deterioration, which requires the highest level of physician preparedness to intervene urgently. I managed/supervised life or organ supporting interventions that required frequent physician assessment. I devoted my full attention to the direct care of this patient for the amount of time indicated below. Time I spent with the family or surrogate(s) is included only if the patient was incapable of providing the necessary information or participating in medical decisions - Time devoted to teaching and to any procedures I billed separately is not included.     CRITICAL CARE TIME:  35 minutes    Electronically signed by LACIE Gaitan CNP on 7/21/2022 at 8:18 PM

## 2022-07-22 NOTE — PROGRESS NOTES
Pulmonary/Critical Care Progress Note    We are following patient for acute hypoxemic respiratory failure, pulmonary edema, bilateral pleural effusions, urinary tract infection, heretofore unnoticed atrial fibrillation, history of coronary artery bypass graft surgery    SUBJECTIVE:  The patient is much more comfortable today arterial blood gases improved a great deal.  Echocardiogram has been done but the reading is pending currently, CA superimposed on CKD    We started him on a very low-dose of apixaban. However, with the patient's age, his risks of bleeding even on low doses of apixaban are still considerable. It may be that the patient should not be on anticoagulants at all because of his advanced age, which is a known risk factor in the administration of anticoagulants in elderly patients. The patient's BUN/creatinine, following the large doses of furosemide he received in the emergency room yesterday, has increased and we will need to be very careful with any further diuretic therapy. In fact, we will hold diuresis for right now since he is doing well on BiPAP and antibiotics which could be treating underlying pneumonia since the chest x-ray shows a consolidated left lower lobe. Aspiration pneumonia cannot be ruled out since he has been falling to some degree lately. The patient may benefit from carvedilol which will also provide vasodilation. We will also add a small dose of hydralazine by mouth.     MEDICATIONS:   piperacillin-tazobactam  3,375 mg IntraVENous Q8H    apixaban  2.5 mg Oral BID    sodium chloride flush  5-40 mL IntraVENous 2 times per day    heparin flush  1 mL IntraVENous 2 times per day    metoprolol tartrate  25 mg Oral BID    sodium chloride flush  5-40 mL IntraVENous 2 times per day    [Held by provider] furosemide  20 mg IntraVENous Q12H    doxycycline (VIBRAMYCIN) IV  100 mg IntraVENous Q12H      sodium chloride      sodium chloride      amiodarone 0.5 mg/min (07/22/22 0400) sodium chloride flush, sodium chloride, heparin flush, sodium chloride flush, sodium chloride, ondansetron **OR** ondansetron, polyethylene glycol, acetaminophen **OR** acetaminophen, perflutren lipid microspheres      REVIEW OF SYSTEMS:  Constitutional: Denies fever, weight loss, night sweats, and fatigue  Skin: Denies pigmentation, dark lesions, and rashes   HEENT: Denies hearing loss, tinnitus, ear drainage, epistaxis, sore throat, and hoarseness. Cardiovascular: Denies palpitations, chest pain, and chest pressure. Respiratory: Denies cough, dyspnea at rest, hemoptysis, apnea, and choking. Gastrointestinal: Denies nausea, vomiting, poor appetite, diarrhea, heartburn or reflux  Genitourinary: Denies dysuria, frequency, urgency or hematuria  Musculoskeletal: Denies myalgias, muscle weakness, and bone pain  Neurological: Denies dizziness, vertigo, headache, and focal weakness  Psychological: Denies anxiety and depression  Endocrine: Denies heat intolerance and cold intolerance  Hematopoietic/Lymphatic: Denies bleeding problems and blood transfusions    OBJECTIVE:  Vitals:    07/22/22 1200   BP: 139/89   Pulse: 90   Resp: 23   Temp: 97.9 °F (36.6 °C)   SpO2: 96%     FiO2 : 50 %  O2 Flow Rate (L/min): 50 L/min  O2 Device: Heated high flow cannula    PHYSICAL EXAM:  Constitutional: No fever, chills, diaphoresis  Skin: No skin rash, no skin breakdown  HEENT: Unremarkable  Neck: No JVD, lymphadenopathy, thyromegaly  Cardiovascular: S1, S2 irregular. No definite S3 heard. No rubs present  Respiratory: Inspiratory crackles over both posterior lower lung fields  Gastrointestinal: Soft, obese, nontender. No hepatosplenomegaly  Genitourinary: No grossly bloody urine  Extremities: 1+ edema in ankles only. No clubbing or cyanosis present  Neurological: Awake, alert, oriented x3. No evidence of focal motor or sensory deficits  Psychological: In good spirits. Appropriate affect.     LABS:  WBC   Date Value Ref Range Status   07/22/2022 13.1 (H) 4.5 - 11.5 E9/L Final   07/21/2022 13.3 (H) 4.5 - 11.5 E9/L Final     Hemoglobin   Date Value Ref Range Status   07/22/2022 13.3 12.5 - 16.5 g/dL Final   07/21/2022 14.1 12.5 - 16.5 g/dL Final     Hematocrit   Date Value Ref Range Status   07/22/2022 42.7 37.0 - 54.0 % Final   07/21/2022 44.7 37.0 - 54.0 % Final     MCV   Date Value Ref Range Status   07/22/2022 95.5 80.0 - 99.9 fL Final   07/21/2022 93.7 80.0 - 99.9 fL Final     Platelets   Date Value Ref Range Status   07/22/2022 218 130 - 450 E9/L Final   07/21/2022 276 130 - 450 E9/L Final     Sodium   Date Value Ref Range Status   07/22/2022 140 132 - 146 mmol/L Final   07/21/2022 143 132 - 146 mmol/L Final     Potassium   Date Value Ref Range Status   07/22/2022 4.2 3.5 - 5.0 mmol/L Final     Potassium reflex Magnesium   Date Value Ref Range Status   07/21/2022 5.0 3.5 - 5.0 mmol/L Final     Chloride   Date Value Ref Range Status   07/22/2022 110 (H) 98 - 107 mmol/L Final   07/21/2022 110 (H) 98 - 107 mmol/L Final     CO2   Date Value Ref Range Status   07/22/2022 20 (L) 22 - 29 mmol/L Final   07/21/2022 19 (L) 22 - 29 mmol/L Final     BUN   Date Value Ref Range Status   07/22/2022 47 (H) 6 - 23 mg/dL Final   07/21/2022 38 (H) 6 - 23 mg/dL Final     Creatinine   Date Value Ref Range Status   07/22/2022 2.0 (H) 0.7 - 1.2 mg/dL Final   07/21/2022 2.0 (H) 0.7 - 1.2 mg/dL Final     Glucose   Date Value Ref Range Status   07/22/2022 139 (H) 74 - 99 mg/dL Final   07/21/2022 148 (H) 74 - 99 mg/dL Final     Calcium   Date Value Ref Range Status   07/22/2022 7.8 (L) 8.6 - 10.2 mg/dL Final   07/21/2022 8.3 (L) 8.6 - 10.2 mg/dL Final     Total Protein   Date Value Ref Range Status   07/21/2022 6.4 6.4 - 8.3 g/dL Final     Albumin   Date Value Ref Range Status   07/21/2022 3.5 3.5 - 5.2 g/dL Final     Total Bilirubin   Date Value Ref Range Status   07/21/2022 1.3 (H) 0.0 - 1.2 mg/dL Final     Alkaline Phosphatase   Date Value Ref Range Status 07/21/2022 100 40 - 129 U/L Final     AST   Date Value Ref Range Status   07/21/2022 95 (H) 0 - 39 U/L Final     ALT   Date Value Ref Range Status   07/21/2022 32 0 - 40 U/L Final     GFR Non-   Date Value Ref Range Status   07/22/2022 32 >=60 mL/min/1.73 Final     Comment:     Chronic Kidney Disease: less than 60 ml/min/1.73 sq.m. Kidney Failure: less than 15 ml/min/1.73 sq.m. Results valid for patients 18 years and older. 07/21/2022 32 >=60 mL/min/1.73 Final     Comment:     Chronic Kidney Disease: less than 60 ml/min/1.73 sq.m. Kidney Failure: less than 15 ml/min/1.73 sq.m. Results valid for patients 18 years and older. GFR    Date Value Ref Range Status   07/22/2022 38  Final   07/21/2022 38  Final     Magnesium   Date Value Ref Range Status   07/22/2022 2.2 1.6 - 2.6 mg/dL Final     Phosphorus   Date Value Ref Range Status   07/22/2022 4.2 2.5 - 4.5 mg/dL Final     Recent Labs     07/21/22  1755 07/22/22  0525   PH 7.362  --    PO2 307.9*  --    PCO2 37.2  --    HCO3 20.6* 23.2   BE -4.2* -2.1   O2SAT 99.3* 99.3*       RADIOLOGY:  CT Head WO Contrast   Final Result   1. No acute intracranial abnormality. 2. Chronic small vessel ischemic disease and generalized cerebral volume loss. 3. Rather diffuse scalp swelling, right greater than left. XR CHEST PORTABLE   Final Result   Cardiomegaly with pulmonary edema and bilateral small pleural effusions   suggestive of congestive heart failure acute exacerbation. Superimposed   pneumonia, particularly in the left lung base is not excluded. XR CHEST PORTABLE    (Results Pending)           PROBLEM LIST:  Principal Problem:    Diastolic CHF, acute ([address]  Resolved Problems:    * No resolved hospital problems.  *      IMPRESSION:  Acute hypoxemic respiratory failure  Likely systolic congestive heart failure (HFrEF)  Consider diastolic heart failure based on echo which is pending  Left lower lobe consolidation possibly consistent with clinical pneumonia (white blood cell count is elevated)  Small bilateral pleural effusions  Likely urinary tract infection  CA superimposed on CKD  Atrial fibrillation of unknown onset  History of coronary artery bypass graft surgery approximately 20 years ago      PLAN:  Hold diuretics for right now since the patient is improving and the BUN went up further following Lasix yesterday  Continue doxycycline and Zosyn  Aerosol treatments 3 times daily with DuoNeb  Monitor renal function carefully  Chest x-ray in a.m. Look for echo results  Add low-dose carvedilol  Add low-dose hydralazine  Continue to collaborate with cardiology    ATTESTATION:  ICU Staff Physician note of personal involvement in Care  As the attending physician, I certify that I personally reviewed the patients history and personally examined the patient to confirm the physical findings described above,  And that I reviewed the relevant imaging studies and available reports. I also discussed the differential diagnosis and all of the proposed management plans with the patient and individuals accompanying the patient to this visit. They had the opportunity to ask questions about the proposed management plans and to have those questions answered. This patient has a high probability of sudden, clinically significant deterioration, which requires the highest level of physician preparedness to intervene urgently. I managed/supervised life or organ supporting interventions that required frequent physician assessment. I devoted my full attention to the direct care of this patient for the amount of time indicated below. Time I spent with the family or surrogate(s) is included only if the patient was incapable of providing the necessary information or participating in medical decisions - Time devoted to teaching and to any procedures I billed separately is not included.     CRITICAL CARE TIME:  29 minutes    Electronically signed by Elayne Montes De Oca MD on 7/22/2022 at 1:39 PM

## 2022-07-22 NOTE — CARE COORDINATION
7/22/2022 Cm transition of care: ICU, amiodarone gtt, airvo, wound care, pt states he has a doctor but doesn't remember the name and he sees him \"not that often\". pcp list being provided and new Eliquis possibly at discharge- free savings card placed in soft blue chart. Pt lives in single story home, alone. He states h he has NO DME at home, no HHC history. One of pts sons will provide a ride at discharge, or his friend. Cm following.  Electronically signed by Mahamed Murdock RN-BC on 7/22/2022 at 9:27 AM

## 2022-07-22 NOTE — PROGRESS NOTES
SL Power Midline Placement 7/22/2022    Product number: [id number]   Lot Number: [id number]      Ultrasound: yes   L Brachial      Upper Arm Circumference: 38cm    Size: 4.5FR    Exposed Length:     Internal Length: 15cm   Cut:    Vein Measurement: 0.5cm    Timbo Wilcox RN  7/22/2022  8:59 PM    No complications  Brisk blood return  Flushed well  Pt tolerated well  RN notified

## 2022-07-23 ENCOUNTER — APPOINTMENT (OUTPATIENT)
Dept: GENERAL RADIOLOGY | Age: 87
DRG: 291 | End: 2022-07-23
Payer: MEDICARE

## 2022-07-23 LAB
ALBUMIN SERPL-MCNC: 2.5 G/DL (ref 3.5–5.2)
ALP BLD-CCNC: 79 U/L (ref 40–129)
ALT SERPL-CCNC: 30 U/L (ref 0–40)
ANION GAP SERPL CALCULATED.3IONS-SCNC: 9 MMOL/L (ref 7–16)
AST SERPL-CCNC: 72 U/L (ref 0–39)
BASOPHILS ABSOLUTE: 0.01 E9/L (ref 0–0.2)
BASOPHILS RELATIVE PERCENT: 0.1 % (ref 0–2)
BILIRUB SERPL-MCNC: 0.7 MG/DL (ref 0–1.2)
BUN BLDV-MCNC: 53 MG/DL (ref 6–23)
CALCIUM SERPL-MCNC: 7.4 MG/DL (ref 8.6–10.2)
CHLORIDE BLD-SCNC: 108 MMOL/L (ref 98–107)
CO2: 25 MMOL/L (ref 22–29)
CREAT SERPL-MCNC: 2 MG/DL (ref 0.7–1.2)
EOSINOPHILS ABSOLUTE: 0.07 E9/L (ref 0.05–0.5)
EOSINOPHILS RELATIVE PERCENT: 0.8 % (ref 0–6)
GFR AFRICAN AMERICAN: 38
GFR NON-AFRICAN AMERICAN: 32 ML/MIN/1.73
GLUCOSE BLD-MCNC: 103 MG/DL (ref 74–99)
HCT VFR BLD CALC: 40.2 % (ref 37–54)
HEMOGLOBIN: 12.3 G/DL (ref 12.5–16.5)
IMMATURE GRANULOCYTES #: 0.03 E9/L
IMMATURE GRANULOCYTES %: 0.3 % (ref 0–5)
LYMPHOCYTES ABSOLUTE: 0.99 E9/L (ref 1.5–4)
LYMPHOCYTES RELATIVE PERCENT: 11 % (ref 20–42)
MAGNESIUM: 2.2 MG/DL (ref 1.6–2.6)
MCH RBC QN AUTO: 29 PG (ref 26–35)
MCHC RBC AUTO-ENTMCNC: 30.6 % (ref 32–34.5)
MCV RBC AUTO: 94.8 FL (ref 80–99.9)
MONOCYTES ABSOLUTE: 0.82 E9/L (ref 0.1–0.95)
MONOCYTES RELATIVE PERCENT: 9.1 % (ref 2–12)
NEUTROPHILS ABSOLUTE: 7.11 E9/L (ref 1.8–7.3)
NEUTROPHILS RELATIVE PERCENT: 78.7 % (ref 43–80)
PDW BLD-RTO: 16.5 FL (ref 11.5–15)
PHOSPHORUS: 3.5 MG/DL (ref 2.5–4.5)
PLATELET # BLD: 189 E9/L (ref 130–450)
PMV BLD AUTO: 10.1 FL (ref 7–12)
POTASSIUM SERPL-SCNC: 4 MMOL/L (ref 3.5–5)
RBC # BLD: 4.24 E12/L (ref 3.8–5.8)
SODIUM BLD-SCNC: 142 MMOL/L (ref 132–146)
TOTAL PROTEIN: 5.2 G/DL (ref 6.4–8.3)
URINE CULTURE, ROUTINE: NORMAL
WBC # BLD: 9 E9/L (ref 4.5–11.5)

## 2022-07-23 PROCEDURE — 6370000000 HC RX 637 (ALT 250 FOR IP): Performed by: INTERNAL MEDICINE

## 2022-07-23 PROCEDURE — 6360000002 HC RX W HCPCS: Performed by: INTERNAL MEDICINE

## 2022-07-23 PROCEDURE — 85025 COMPLETE CBC W/AUTO DIFF WBC: CPT

## 2022-07-23 PROCEDURE — 6360000002 HC RX W HCPCS: Performed by: NURSE PRACTITIONER

## 2022-07-23 PROCEDURE — 2700000000 HC OXYGEN THERAPY PER DAY

## 2022-07-23 PROCEDURE — 36592 COLLECT BLOOD FROM PICC: CPT

## 2022-07-23 PROCEDURE — 84100 ASSAY OF PHOSPHORUS: CPT

## 2022-07-23 PROCEDURE — 99232 SBSQ HOSP IP/OBS MODERATE 35: CPT | Performed by: INTERNAL MEDICINE

## 2022-07-23 PROCEDURE — 99233 SBSQ HOSP IP/OBS HIGH 50: CPT | Performed by: INTERNAL MEDICINE

## 2022-07-23 PROCEDURE — 2500000003 HC RX 250 WO HCPCS: Performed by: NURSE PRACTITIONER

## 2022-07-23 PROCEDURE — 71045 X-RAY EXAM CHEST 1 VIEW: CPT

## 2022-07-23 PROCEDURE — 94660 CPAP INITIATION&MGMT: CPT

## 2022-07-23 PROCEDURE — 2580000003 HC RX 258: Performed by: INTERNAL MEDICINE

## 2022-07-23 PROCEDURE — 80053 COMPREHEN METABOLIC PANEL: CPT

## 2022-07-23 PROCEDURE — 83735 ASSAY OF MAGNESIUM: CPT

## 2022-07-23 PROCEDURE — 2580000003 HC RX 258: Performed by: NURSE PRACTITIONER

## 2022-07-23 PROCEDURE — 2000000000 HC ICU R&B

## 2022-07-23 RX ORDER — MIDODRINE HYDROCHLORIDE 5 MG/1
15 TABLET ORAL
Status: DISCONTINUED | OUTPATIENT
Start: 2022-07-23 | End: 2022-07-24

## 2022-07-23 RX ORDER — PANTOPRAZOLE SODIUM 40 MG/1
40 TABLET, DELAYED RELEASE ORAL
Status: DISCONTINUED | OUTPATIENT
Start: 2022-07-24 | End: 2022-07-28 | Stop reason: HOSPADM

## 2022-07-23 RX ADMIN — PIPERACILLIN AND TAZOBACTAM 3375 MG: 3; .375 INJECTION, POWDER, LYOPHILIZED, FOR SOLUTION INTRAVENOUS at 01:26

## 2022-07-23 RX ADMIN — MIDODRINE HYDROCHLORIDE 15 MG: 5 TABLET ORAL at 17:59

## 2022-07-23 RX ADMIN — CARVEDILOL 6.25 MG: 6.25 TABLET, FILM COATED ORAL at 09:01

## 2022-07-23 RX ADMIN — PIPERACILLIN AND TAZOBACTAM 3375 MG: 3; .375 INJECTION, POWDER, LYOPHILIZED, FOR SOLUTION INTRAVENOUS at 18:02

## 2022-07-23 RX ADMIN — DOXYCYCLINE 100 MG: 100 INJECTION, POWDER, LYOPHILIZED, FOR SOLUTION INTRAVENOUS at 11:41

## 2022-07-23 RX ADMIN — PIPERACILLIN AND TAZOBACTAM 3375 MG: 3; .375 INJECTION, POWDER, LYOPHILIZED, FOR SOLUTION INTRAVENOUS at 09:06

## 2022-07-23 RX ADMIN — AMIODARONE HYDROCHLORIDE 200 MG: 200 TABLET ORAL at 09:01

## 2022-07-23 RX ADMIN — APIXABAN 2.5 MG: 2.5 TABLET, FILM COATED ORAL at 09:01

## 2022-07-23 RX ADMIN — HEPARIN 100 UNITS: 100 SYRINGE at 20:58

## 2022-07-23 RX ADMIN — Medication 10 ML: at 09:06

## 2022-07-23 RX ADMIN — MIDODRINE HYDROCHLORIDE 15 MG: 5 TABLET ORAL at 13:09

## 2022-07-23 RX ADMIN — HEPARIN 100 UNITS: 100 SYRINGE at 09:01

## 2022-07-23 RX ADMIN — APIXABAN 2.5 MG: 2.5 TABLET, FILM COATED ORAL at 20:58

## 2022-07-23 RX ADMIN — Medication 10 ML: at 20:58

## 2022-07-23 RX ADMIN — SODIUM CHLORIDE 5 ML/HR: 9 INJECTION, SOLUTION INTRAVENOUS at 09:03

## 2022-07-23 ASSESSMENT — PAIN SCALES - GENERAL
PAINLEVEL_OUTOF10: 0

## 2022-07-23 NOTE — PROGRESS NOTES
Stephania Harrison MD, 6.25 mg at 07/23/22 0901    [Held by provider] hydrALAZINE (APRESOLINE) tablet 10 mg, 10 mg, Oral, 3 times per day, Roberto Marinelli MD    amiodarone (CORDARONE) tablet 200 mg, 200 mg, Oral, Daily, Nba Holley MD, 200 mg at 07/23/22 0901    sodium chloride flush 0.9 % injection 5-40 mL, 5-40 mL, IntraVENous, 2 times per day, Roberto Marinelli MD, 10 mL at 07/22/22 2241    sodium chloride flush 0.9 % injection 5-40 mL, 5-40 mL, IntraVENous, PRN, Roberto Marinelli MD    0.9 % sodium chloride infusion, , IntraVENous, PRN, Roberto Marinelli MD    ondansetron (ZOFRAN-ODT) disintegrating tablet 4 mg, 4 mg, Oral, Q8H PRN **OR** ondansetron (ZOFRAN) injection 4 mg, 4 mg, IntraVENous, Q6H PRN, Roberto Marinelli MD    polyethylene glycol (GLYCOLAX) packet 17 g, 17 g, Oral, Daily PRN, Roberto Marinelli MD    acetaminophen (TYLENOL) tablet 650 mg, 650 mg, Oral, Q6H PRN **OR** acetaminophen (TYLENOL) suppository 650 mg, 650 mg, Rectal, Q6H PRN, Roberto Marinelli MD    perflutren lipid microspheres (DEFINITY) injection 1.65 mg, 1.5 mL, IntraVENous, ONCE PRN, Roberto Marinelli MD    [Held by provider] furosemide (LASIX) injection 20 mg, 20 mg, IntraVENous, Q12H, LACIE Sumner - CNP    Physical Exam:  BP (!) 96/56   Pulse 73   Temp 97.9 °F (36.6 °C) (Axillary)   Resp 28   Ht 6' (1.829 m) Comment: Estimated  Wt 255 lb 14.4 oz (116.1 kg)   SpO2 97%   BMI 34.71 kg/m²   Wt Readings from Last 3 Encounters:   07/23/22 255 lb 14.4 oz (116.1 kg)     Appearance: Awake, alert, no acute respiratory distress  Skin: Intact, no rash  Head: Normocephalic, atraumatic  Eyes: EOMI, no conjunctival erythema  ENMT: No pharyngeal erythema, MMM, no rhinorrhea  Neck: Supple, no elevated JVP, no carotid bruits  Lungs: Clear to auscultation bilaterally. No wheezes, rales, or rhonchi.   Cardiac: PMI nondisplaced, irregularly irregular rhythm with a normal rate, S1 & S2 normal, no murmurs  Abdomen: Soft, nontender, +bowel sounds  Extremities: Moves all extremities x 4, no lower extremity edema  Neurologic: No focal motor deficits apparent, normal mood and affect  Peripheral Pulses: Intact posterior tibial pulses bilaterally    Intake/Output:    Intake/Output Summary (Last 24 hours) at 7/23/2022 1310  Last data filed at 7/23/2022 5748  Gross per 24 hour   Intake 1761 ml   Output 925 ml   Net 836 ml     No intake/output data recorded. Laboratory Tests:  Recent Labs     07/21/22  1410 07/22/22  0421 07/23/22  0520    140 142   K 5.0 4.2 4.0   * 110* 108*   CO2 19* 20* 25   BUN 38* 47* 53*   CREATININE 2.0* 2.0* 2.0*   GLUCOSE 148* 139* 103*   CALCIUM 8.3* 7.8* 7.4*     Lab Results   Component Value Date/Time    MG 2.2 07/23/2022 05:20 AM     Recent Labs     07/21/22  1410 07/23/22  0520   ALKPHOS 100 79   ALT 32 30   AST 95* 72*   PROT 6.4 5.2*   BILITOT 1.3* 0.7   LABALBU 3.5 2.5*     Recent Labs     07/21/22  1410 07/22/22  0421 07/23/22  0520   WBC 13.3* 13.1* 9.0   RBC 4.77 4.47 4.24   HGB 14.1 13.3 12.3*   HCT 44.7 42.7 40.2   MCV 93.7 95.5 94.8   MCH 29.6 29.8 29.0   MCHC 31.5* 31.1* 30.6*   RDW 16.5* 16.5* 16.5*    218 189   MPV 10.0 9.6 10.1     Lab Results   Component Value Date    CKTOTAL 3,244 (H) 07/21/2022     No results found for: INR, PROTIME  Lab Results   Component Value Date    TSH 1.550 07/22/2022     No results found for: LABA1C  No results found for: EAG  No results found for: CHOL  No results found for: TRIG  No results found for: HDL  No results found for: LDLCALC, LDLCHOLESTEROL  No results found for: LABVLDL, VLDL  No results found for: [facility]  Recent Labs     07/21/22  1410   PROBNP 10,854*       Cardiac Tests:  Echocardiogram from 7/22/2022:   Technically sub-optimal images. Normal left ventricular chamber size. Mild global hypokinesis, abnormal septal motion, LV EF 40 +/- 3%. Indeterminate LV diastolic function. Severely increased left atrial volume.    Interatrial septum not well visualized but appears intact. Right ventricle enlarged with decreased function. Moderately enlarged right atrium. Moderate eccentric mitral regurgitation present. No mitral valve prolapse. No hemodynamically significant aortic stenosis. There is mild aortic regurgitation. There is mild to moderate tricuspid regurgitation. Moderate Pulmonary hypertension, RVSP 62mmHg. Normal aortic root size. No evidence of pericardial effusion. Pleural effusion noted. The inferior vena cava dilated with decreased respiratory variation. No intra cardiac mass or thrombus. No comparison study available. IMPRESSION / RECOMMENDATIONS:     Acute Heart Failure -new onset systolic heart failure pending his Echo - Continue Lasix as his BP, renal Fn tolerates. His Lasix has been discontinued today. He still appears hypervolemic on examination. Would recommend continuation and checking of BNP tomorrow. Monitor daily Wts, I/Os, BP, renal Fn  He does have chronic renal failure. Currently, he is on Coreg and hydralazine. Unable to use ACE or Arni on account of renal function. Unable to use Aldactone for the same reason. Patient is currently DNR and does not want any further interventions. Acute hypoxic respiratory failure - Per Pulmonary/Critical care     Elevated Troponin - Likely demand ischemia from hypoxic respiratory failure and Rhabdo - no CP, EKG reviewed - No further cardiac testing as described above. A Fb with RVR - New. Rate control with Amiodarone and BB. 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 Add low dose BB; Hold his home ASA due to Eliquis, No statin due to his age and he \"didn't want to take it in the past\"     Delores vs CKD - Monitor renal Fn     Moderate obesity    Merlin Arnold MD, [address] [facility]    NOTE: This report was transcribed using voice recognition software.  Every effort was

## 2022-07-23 NOTE — PROGRESS NOTES
Admitting Date and Time: 7/21/2022  1:41 PM  Admit Dx: Acute pulmonary edema (HCC) [B10.6]  Diastolic CHF, acute (HCC) [I50.31]  Acute respiratory failure with hypoxia (HCC) [J96.01]  Atrial fibrillation, unspecified type ([facility] [I48.91]    Subjective:  Kersey concerned about movement in and around his bed and daily care   Per RN: no new issues    ROS: denies fever, chills, cp, sob, n/v, HA unless stated above.      [START ON 7/24/2022] pantoprazole  40 mg Oral QAM AC    midodrine  15 mg Oral TID WC    piperacillin-tazobactam  3,375 mg IntraVENous Q8H    apixaban  2.5 mg Oral BID    sodium chloride flush  5-40 mL IntraVENous 2 times per day    heparin flush  1 mL IntraVENous 2 times per day    [Held by provider] carvedilol  6.25 mg Oral BID WC    [Held by provider] hydrALAZINE  10 mg Oral 3 times per day    amiodarone  200 mg Oral Daily    sodium chloride flush  5-40 mL IntraVENous 2 times per day    [Held by provider] furosemide  20 mg IntraVENous Q12H     sodium chloride flush, 5-40 mL, PRN  sodium chloride, , PRN  heparin flush, 1 mL, PRN  sodium chloride flush, 5-40 mL, PRN  sodium chloride, , PRN  ondansetron, 4 mg, Q8H PRN   Or  ondansetron, 4 mg, Q6H PRN  polyethylene glycol, 17 g, Daily PRN  acetaminophen, 650 mg, Q6H PRN   Or  acetaminophen, 650 mg, Q6H PRN  perflutren lipid microspheres, 1.5 mL, ONCE PRN       Objective:    BP (!) 106/58   Pulse 74   Temp 97.7 °F (36.5 °C) (Oral)   Resp 20   Ht 6' (1.829 m) Comment: Estimated  Wt 255 lb 14.4 oz (116.1 kg)   SpO2 91%   BMI 34.71 kg/m²   General Appearance: alert and oriented to person, place and time and in no acute distress  Skin: warm and dry  Head: normocephalic and atraumatic  Eyes: pupils equal, round, and reactive to light, extraocular eye movements intact, conjunctivae normal  Neck: neck supple and non tender without mass   Pulmonary/Chest: clear to auscultation bilaterally- no wheezes, rales or rhonchi, normal air movement, no respiratory distress  Cardiovascular: normal rate, normal S1 and S2 and no carotid bruits  Abdomen: soft, non-tender, non-distended, normal bowel sounds, no masses or organomegaly  Extremities: no cyanosis, no clubbing   Still significant anasarca  Neurologic: no cranial nerve deficit and speech normal      Recent Labs     07/21/22  1410 07/22/22  0421 07/23/22  0520    140 142   K 5.0 4.2 4.0   * 110* 108*   CO2 19* 20* 25   BUN 38* 47* 53*   CREATININE 2.0* 2.0* 2.0*   GLUCOSE 148* 139* 103*   CALCIUM 8.3* 7.8* 7.4*         Recent Labs     07/21/22  1410 07/23/22  0520   ALKPHOS 100 79   PROT 6.4 5.2*   LABALBU 3.5 2.5*   BILITOT 1.3* 0.7   AST 95* 72*   ALT 32 30         Recent Labs     07/21/22  1410 07/22/22  0421 07/23/22  0520   WBC 13.3* 13.1* 9.0   RBC 4.77 4.47 4.24   HGB 14.1 13.3 12.3*   HCT 44.7 42.7 40.2   MCV 93.7 95.5 94.8   MCH 29.6 29.8 29.0   MCHC 31.5* 31.1* 30.6*   RDW 16.5* 16.5* 16.5*    218 189   MPV 10.0 9.6 10.1             Radiology:   XR CHEST PORTABLE   Final Result   Persistent findings of a decompensated congestive heart failure. No   significant changes since July 21st.         CT Head WO Contrast   Final Result   1. No acute intracranial abnormality. 2. Chronic small vessel ischemic disease and generalized cerebral volume loss. 3. Rather diffuse scalp swelling, right greater than left. XR CHEST PORTABLE   Final Result   Cardiomegaly with pulmonary edema and bilateral small pleural effusions   suggestive of congestive heart failure acute exacerbation. Superimposed   pneumonia, particularly in the left lung base is not excluded. Assessment:  Principal Problem:    Diastolic CHF, acute ([address]  Active Problems:    Acute pulmonary edema (HCC)  Resolved Problems:    * No resolved hospital problems. *      Plan: History of present illness from History and Physical:   80 y.o. male with a history of CAD s/p CABG presents with unresponsiveness.   Initially in the ER no history was able to be obtained. Later son came in and helped with the following. Patient is socially active. He was last seen by the son last week however was seen by his friends 1 or 2 days ago. He was found on the floor at his home. He was hypoxic. When he presented into the emergency room the emergency room doctor said that he was satting in the 70%. BiPAP was placed approximately 1 to 2 hours prior to my assessment of the patient. He had not been responsive or coherent prior to that. The son discussed CODE STATUS with the ED doctor so the patient is wishing to be a DNR DNI  We are still not able to obtain details as mentioned above and below. During my assessment the son and other family members apparently have left the department    During his ER course he was also given a Oneil on nitro drip a dose of Lasix 80 mg IV. 2D echo 7/22/2022:  Technically sub-optimal images. Normal left ventricular chamber size. Mild global hypokinesis, abnormal septal motion, LV EF 40 +/- 3%. Indeterminate LV diastolic function. Left atrium is moderately enlarged. Severely increased left atrial volume. Interatrial septum not well visualized but appears intact. Right ventricle enlarged with decreased function. Moderately enlarged right atrium. Moderate eccentric mitral regurgitation present. No mitral valve prolapse. No hemodynamically significant aortic stenosis. There is mild aortic regurgitation. There is mild to moderate tricuspid regurgitation. Moderate Pulmonary hypertension, RVSP 62mmHg. Normal aortic root size. No evidence of pericardial effusion. Pleural effusion noted. The inferior vena cava dilated with decreased respiratory variation. No intra cardiac mass or thrombus. No comparison study available. Acute decompensated systolic heart failure causing acute respiratory failure. Clinically responding well to BiPAP. Hold further  Lasix now. See #3.    A. fib RVR rate. Antiarrhythmics adjusted by intensivist    Antonio:  no change in crt but inc in bun increase. Life threatening noncompliance. Only takes baby asa at home. 7/22: I thoroughly reasoned with him, empathesized with his concern about becoming a burden to his family. I explained how med compliance would increase his health and decrease chance of debility. He understood but I question his cognitive abilities and insight.  7/23: I discussed the utility of being compliant as I have stated previously above and asked him for feedback. he asked me to repeat. I did more than once. but he did not seem to be able to address or understand these issues even when I confirmed that he is hearing what I am saying. DVT prophylaxis: Eliquis   DNR CCA. Disposition likely needs SNF    NOTE: This report was transcribed using voice recognition software. Every effort was made to ensure accuracy; however, inadvertent computerized transcription errors may be present.      Electronically signed by Stephanie Parson MD on 7/23/2022 at 5:06 PM

## 2022-07-23 NOTE — PROGRESS NOTES
Assessment and Plan  Patient is a 80 y.o. male with the following medical Problems:   Acute hypoxic respiratory failure secondary to   pulmonary edema   Moderate Pulmonary hypertension, RVSP 62mmHg. Decompensated HFrEF  Bilateral pleural effusions  NSTEMI  New onset atrial fibrillation with RVR  Urinary tract infection  Acute metabolic encephalopathy-possibly secondary to urinary tract infection  CKD  Chronic stasis dermatitis with ulcerations  Obesity-BMI 34.45        Plan of care:  1. Supplemental oxygen to keep sat 88 to 94%  2. Cardiology is managing heart failure. 3.  Judicious diuresis with close monitoring of kidney function  4. Resume Eliquis which covers for DVT prophylaxis  5. Discontinue doxycycline and continue Zosyn  6. GI prophylaxis with Protonix  7.  PT, OT, and out of bed as tolerated  8. Transfer to medical floor      History of Present Illness:    July 23, 2022:  Patient is a 80-year-old man with ischemic cardiomyopathy (status post CABG) and HFeEF was admitted on July 21, 2022. Patient was apparently found unresponsive on the floor by a friend who called EMS. Patient was found to be hypoxic and brought to the ED for further evaluation. Patient was apparently obtunded in the ED on presentation, however his mental status has improved on admission. Patient is somnolent but able to follow commands. He is a poor historian. Patient's son who was at bedside provided partial history and stated he last saw the patient on Sunday. It is not known how long the patient was down before being found. The patient also apparently hit his head when he fell. Patient was placed on BiPAP in the ED for acute hypoxic respiratory failure. Portable chest x-ray showed cardiomegaly, pulmonary edema/CHF, and possible superimposed bacterial pneumonia. He also received 80 mg of Lasix IV push and a nitroglycerin drip was initiated. Patient is a DNR CCA.   CT head without contrast obtained in the ED prior to admission showed no acute abnormality. Chronic small vessel ischemia and generalized cerebral volume loss noted. Diffuse scalp edema noted. Patient's blood pressure is borderline low, however, he denies fever, chills, rigors, and chest pain. Continues to be on supplemental oxygen at 4 L/min. Creatinine has been stable. Patient is stable to be transferred to medical floor. Past Medical History:  No past medical history on file. Family History:   No family history on file. Allergies:         Patient has no known allergies. Social history:  Social History     Socioeconomic History    Marital status:       Spouse name: Not on file    Number of children: Not on file    Years of education: Not on file    Highest education level: Not on file   Occupational History    Not on file   Tobacco Use    Smoking status: Not on file    Smokeless tobacco: Not on file   Substance and Sexual Activity    Alcohol use: Not on file    Drug use: Not on file    Sexual activity: Not on file   Other Topics Concern    Not on file   Social History Narrative    Not on file     Social Determinants of Health     Financial Resource Strain: Not on file   Food Insecurity: Not on file   Transportation Needs: Not on file   Physical Activity: Not on file   Stress: Not on file   Social Connections: Not on file   Intimate Partner Violence: Not on file   Housing Stability: Not on file       Current Medications:     Current Facility-Administered Medications:     piperacillin-tazobactam (ZOSYN) 3,375 mg in dextrose 5 % 50 mL IVPB extended infusion (mini-bag), 3,375 mg, IntraVENous, Q8H, LACIE Sumner CNP, Last Rate: 12.5 mL/hr at 07/23/22 0906, 3,375 mg at 07/23/22 0906    apixaban (ELIQUIS) tablet 2.5 mg, 2.5 mg, Oral, BID, Ko Jane MD, 2.5 mg at 07/23/22 0901    sodium chloride flush 0.9 % injection 5-40 mL, 5-40 mL, IntraVENous, 2 times per day, Ko Jane MD, 10 mL at 07/23/22 0906    sodium chloride flush 0.9 % injection 5-40 mL, 5-40 mL, IntraVENous, PRN, Beto Pedersen MD    0.9 % sodium chloride infusion, , IntraVENous, PRN, Beto Pedersen MD, Last Rate: 5 mL/hr at 07/23/22 0903, 5 mL/hr at 07/23/22 0903    heparin flush 100 UNIT/ML injection 100 Units, 1 mL, IntraVENous, 2 times per day, Beto Pedersen MD, 100 Units at 07/23/22 0901    heparin flush 100 UNIT/ML injection 100 Units, 1 mL, IntraCATHeter, PRN, Beto Pedersen MD    carvedilol (COREG) tablet 6.25 mg, 6.25 mg, Oral, BID WC, Chris Chamberlain MD, 6.25 mg at 07/23/22 0901    hydrALAZINE (APRESOLINE) tablet 10 mg, 10 mg, Oral, 3 times per day, Chris Chamberlain MD    amiodarone (CORDARONE) tablet 200 mg, 200 mg, Oral, Daily, Beto Pedersen MD, 200 mg at 07/23/22 0901    sodium chloride flush 0.9 % injection 5-40 mL, 5-40 mL, IntraVENous, 2 times per day, Chris Chamberlain MD, 10 mL at 07/22/22 2241    sodium chloride flush 0.9 % injection 5-40 mL, 5-40 mL, IntraVENous, PRN, Chris Chamberlain MD    0.9 % sodium chloride infusion, , IntraVENous, PRN, Chris Chamberlain MD    ondansetron (ZOFRAN-ODT) disintegrating tablet 4 mg, 4 mg, Oral, Q8H PRN **OR** ondansetron (ZOFRAN) injection 4 mg, 4 mg, IntraVENous, Q6H PRN, Chris Chamberlain MD    polyethylene glycol (GLYCOLAX) packet 17 g, 17 g, Oral, Daily PRN, Chris Chamberlain MD    acetaminophen (TYLENOL) tablet 650 mg, 650 mg, Oral, Q6H PRN **OR** acetaminophen (TYLENOL) suppository 650 mg, 650 mg, Rectal, Q6H PRN, Chris Chamberlain MD    perflutren lipid microspheres (DEFINITY) injection 1.65 mg, 1.5 mL, IntraVENous, ONCE PRN, Chris Chamberlain MD    [Held by provider] furosemide (LASIX) injection 20 mg, 20 mg, IntraVENous, Q12H, Beck Canas APRN - CNP    doxycycline (VIBRAMYCIN) 100 mg in sodium chloride 0.9 % 100 mL IVPB, 100 mg, IntraVENous, Q12H, LACIE Sumner - CNP, Last Rate: 100 mL/hr at 07/23/22 1141, 100 mg at 07/23/22 1141    Review of Systems:   General: denies weight gain, denies loss of appetite, fever, chills, night sweats. HEENT: denies headaches, dizziness, head trauma, visual changes, eye pain, tinnitus, nosebleeds, hoarseness or throat pain    Respiratory: denies chest pain, dyspnea, cough and hemoptysis  Cardiovascular: denies orthopnea, paroxysmal nocturnal dyspnea, leg swelling, and previous heart attack. Gastrointestinal: denies pain, nausea vomiting, diarrhea, constipation, melena or bleeding. Genitourinary: denies hematuria, frequency, urgency or dysuria  Neurology: denies syncope, seizures, paralysis, paraesthesia   Endocrine: denies polyuria, polydipsia, skin or hair changes, and heat or cold intolerance  Musculoskeletal: denies joint pain, swelling, arthritis or myalgia  Hematologic: denies bleeding, adenopathy and easy bruising  Skin: denies rashes and skin discoloration  Psychiatry: denies depression    Physical Exam:   Vital Signs:  BP (!) 96/56   Pulse 73   Temp 97.9 °F (36.6 °C) (Axillary)   Resp 28   Ht 6' (1.829 m) Comment: Estimated  Wt 255 lb 14.4 oz (116.1 kg)   SpO2 97%   BMI 34.71 kg/m²     Input/Output:  In: 2541 [P.O.:1320; I.V.:593.4]  Out: 925     Oxygen requirements: NC    Ventilator Information:       General appearance: not in pain or distress, in no respiratory distress    HEENT: Atraumatic/normocephalic, EOMI, ELIGIO, pharynx clear, moist mucosa, redness of the uvula appreciated,   Neck: Supple, no jugular venous distension, lymphadenopathy, thyromegaly or carotid bruits  Chest: Decreased breath sounds, no wheezing, +ve crackles and no tenderness over ribs   Cardiovascular: Normal S1 , S2, regular rate and rhythm, no murmur, rub or gallop  Abdomen: Normal sounds present, soft, lax with no tenderness, no hepatosplenomegaly, and no masses  Extremities: +ve edema with rash. Pulses are equally present.    Skin: intact, no rashes   Neurologic: Alert and oriented x 3, No focal deficit     Investigations:  Labs, radiological imaging and cardiac work up were reviewed        ICU STAFF PHYSICIAN NOTE OF PERSONAL INVOLVEMENT IN CARE  As the attending physician, I certify that I personally reviewed the patient's history and personally examined the patient to confirm the physical findings described above, and that I reviewed the relevant imaging studies and available reports. I also discussed the differential diagnosis and all of the proposed management plans with the patient and individuals accompanying the patient to this visit. They had the opportunity to ask questions about the proposed management plans and to have those questions answered. This patient has a high probability of sudden, clinically significant deterioration, which requires the highest level of physician preparedness to intervene urgently. I managed/supervised life or organ supporting interventions that required frequent physician assessment. I devoted my full attention to the direct care of this patient for the amount of time indicated below. Time I spent with family or surrogate(s) is included only if the patient was incapable of providing the necessary information or participating in medical decision-making. Time devoted to teaching and to any procedures I billed separately is not included.   Critical Care Time: 35 minutes      Electronically signed by Rolando Conklin MD on 7/23/2022 at 11:50 AM

## 2022-07-24 ENCOUNTER — APPOINTMENT (OUTPATIENT)
Dept: GENERAL RADIOLOGY | Age: 87
DRG: 291 | End: 2022-07-24
Payer: MEDICARE

## 2022-07-24 LAB
ALBUMIN SERPL-MCNC: 2.7 G/DL (ref 3.5–5.2)
ALP BLD-CCNC: 76 U/L (ref 40–129)
ALT SERPL-CCNC: 30 U/L (ref 0–40)
ANION GAP SERPL CALCULATED.3IONS-SCNC: 6 MMOL/L (ref 7–16)
AST SERPL-CCNC: 51 U/L (ref 0–39)
BASOPHILS ABSOLUTE: 0.01 E9/L (ref 0–0.2)
BASOPHILS RELATIVE PERCENT: 0.1 % (ref 0–2)
BILIRUB SERPL-MCNC: 0.7 MG/DL (ref 0–1.2)
BUN BLDV-MCNC: 55 MG/DL (ref 6–23)
CALCIUM SERPL-MCNC: 7.6 MG/DL (ref 8.6–10.2)
CHLORIDE BLD-SCNC: 105 MMOL/L (ref 98–107)
CO2: 27 MMOL/L (ref 22–29)
CREAT SERPL-MCNC: 1.9 MG/DL (ref 0.7–1.2)
EOSINOPHILS ABSOLUTE: 0.11 E9/L (ref 0.05–0.5)
EOSINOPHILS RELATIVE PERCENT: 1.3 % (ref 0–6)
GFR AFRICAN AMERICAN: 41
GFR NON-AFRICAN AMERICAN: 34 ML/MIN/1.73
GLUCOSE BLD-MCNC: 103 MG/DL (ref 74–99)
HCT VFR BLD CALC: 39.9 % (ref 37–54)
HEMOGLOBIN: 12.5 G/DL (ref 12.5–16.5)
IMMATURE GRANULOCYTES #: 0.03 E9/L
IMMATURE GRANULOCYTES %: 0.4 % (ref 0–5)
LYMPHOCYTES ABSOLUTE: 1.1 E9/L (ref 1.5–4)
LYMPHOCYTES RELATIVE PERCENT: 13 % (ref 20–42)
MAGNESIUM: 2.2 MG/DL (ref 1.6–2.6)
MCH RBC QN AUTO: 30 PG (ref 26–35)
MCHC RBC AUTO-ENTMCNC: 31.3 % (ref 32–34.5)
MCV RBC AUTO: 95.9 FL (ref 80–99.9)
MONOCYTES ABSOLUTE: 0.76 E9/L (ref 0.1–0.95)
MONOCYTES RELATIVE PERCENT: 9 % (ref 2–12)
NEUTROPHILS ABSOLUTE: 6.46 E9/L (ref 1.8–7.3)
NEUTROPHILS RELATIVE PERCENT: 76.2 % (ref 43–80)
PDW BLD-RTO: 16.2 FL (ref 11.5–15)
PHOSPHORUS: 3.4 MG/DL (ref 2.5–4.5)
PLATELET # BLD: 195 E9/L (ref 130–450)
PMV BLD AUTO: 9.2 FL (ref 7–12)
POTASSIUM SERPL-SCNC: 4.3 MMOL/L (ref 3.5–5)
RBC # BLD: 4.16 E12/L (ref 3.8–5.8)
SODIUM BLD-SCNC: 138 MMOL/L (ref 132–146)
TOTAL PROTEIN: 5.3 G/DL (ref 6.4–8.3)
WBC # BLD: 8.5 E9/L (ref 4.5–11.5)

## 2022-07-24 PROCEDURE — 36592 COLLECT BLOOD FROM PICC: CPT

## 2022-07-24 PROCEDURE — 80053 COMPREHEN METABOLIC PANEL: CPT

## 2022-07-24 PROCEDURE — 6360000002 HC RX W HCPCS: Performed by: NURSE PRACTITIONER

## 2022-07-24 PROCEDURE — 6370000000 HC RX 637 (ALT 250 FOR IP): Performed by: INTERNAL MEDICINE

## 2022-07-24 PROCEDURE — 2580000003 HC RX 258: Performed by: INTERNAL MEDICINE

## 2022-07-24 PROCEDURE — 97530 THERAPEUTIC ACTIVITIES: CPT | Performed by: PHYSICAL THERAPIST

## 2022-07-24 PROCEDURE — 85025 COMPLETE CBC W/AUTO DIFF WBC: CPT

## 2022-07-24 PROCEDURE — 84100 ASSAY OF PHOSPHORUS: CPT

## 2022-07-24 PROCEDURE — 97112 NEUROMUSCULAR REEDUCATION: CPT | Performed by: PHYSICAL THERAPIST

## 2022-07-24 PROCEDURE — 99233 SBSQ HOSP IP/OBS HIGH 50: CPT | Performed by: INTERNAL MEDICINE

## 2022-07-24 PROCEDURE — 71045 X-RAY EXAM CHEST 1 VIEW: CPT

## 2022-07-24 PROCEDURE — 2000000000 HC ICU R&B

## 2022-07-24 PROCEDURE — 83735 ASSAY OF MAGNESIUM: CPT

## 2022-07-24 PROCEDURE — 2580000003 HC RX 258: Performed by: NURSE PRACTITIONER

## 2022-07-24 PROCEDURE — 97161 PT EVAL LOW COMPLEX 20 MIN: CPT | Performed by: PHYSICAL THERAPIST

## 2022-07-24 PROCEDURE — 6360000002 HC RX W HCPCS: Performed by: INTERNAL MEDICINE

## 2022-07-24 PROCEDURE — 2700000000 HC OXYGEN THERAPY PER DAY

## 2022-07-24 PROCEDURE — 99232 SBSQ HOSP IP/OBS MODERATE 35: CPT | Performed by: INTERNAL MEDICINE

## 2022-07-24 RX ORDER — FUROSEMIDE 10 MG/ML
40 INJECTION INTRAMUSCULAR; INTRAVENOUS DAILY
Status: DISCONTINUED | OUTPATIENT
Start: 2022-07-25 | End: 2022-07-24

## 2022-07-24 RX ORDER — CARVEDILOL 3.12 MG/1
3.12 TABLET ORAL 2 TIMES DAILY WITH MEALS
Status: DISCONTINUED | OUTPATIENT
Start: 2022-07-24 | End: 2022-07-28 | Stop reason: HOSPADM

## 2022-07-24 RX ORDER — FUROSEMIDE 10 MG/ML
40 INJECTION INTRAMUSCULAR; INTRAVENOUS ONCE
Status: COMPLETED | OUTPATIENT
Start: 2022-07-24 | End: 2022-07-24

## 2022-07-24 RX ADMIN — PIPERACILLIN AND TAZOBACTAM 3375 MG: 3; .375 INJECTION, POWDER, LYOPHILIZED, FOR SOLUTION INTRAVENOUS at 17:32

## 2022-07-24 RX ADMIN — Medication 10 ML: at 20:32

## 2022-07-24 RX ADMIN — Medication 10 ML: at 20:31

## 2022-07-24 RX ADMIN — FUROSEMIDE 40 MG: 10 INJECTION, SOLUTION INTRAMUSCULAR; INTRAVENOUS at 12:05

## 2022-07-24 RX ADMIN — PIPERACILLIN AND TAZOBACTAM 3375 MG: 3; .375 INJECTION, POWDER, LYOPHILIZED, FOR SOLUTION INTRAVENOUS at 09:01

## 2022-07-24 RX ADMIN — HEPARIN 100 UNITS: 100 SYRINGE at 09:03

## 2022-07-24 RX ADMIN — APIXABAN 2.5 MG: 2.5 TABLET, FILM COATED ORAL at 20:30

## 2022-07-24 RX ADMIN — AMIODARONE HYDROCHLORIDE 200 MG: 200 TABLET ORAL at 10:18

## 2022-07-24 RX ADMIN — HEPARIN 100 UNITS: 100 SYRINGE at 20:31

## 2022-07-24 RX ADMIN — HYDRALAZINE HYDROCHLORIDE 10 MG: 10 TABLET, FILM COATED ORAL at 21:49

## 2022-07-24 RX ADMIN — HYDRALAZINE HYDROCHLORIDE 10 MG: 10 TABLET, FILM COATED ORAL at 13:53

## 2022-07-24 RX ADMIN — PIPERACILLIN AND TAZOBACTAM 3375 MG: 3; .375 INJECTION, POWDER, LYOPHILIZED, FOR SOLUTION INTRAVENOUS at 00:38

## 2022-07-24 RX ADMIN — Medication 10 ML: at 09:04

## 2022-07-24 RX ADMIN — Medication 10 ML: at 00:32

## 2022-07-24 RX ADMIN — CARVEDILOL 3.12 MG: 3.12 TABLET, FILM COATED ORAL at 17:29

## 2022-07-24 RX ADMIN — Medication 10 ML: at 09:03

## 2022-07-24 ASSESSMENT — PAIN SCALES - GENERAL
PAINLEVEL_OUTOF10: 0

## 2022-07-24 NOTE — PROGRESS NOTES
Admitting Date and Time: 7/21/2022  1:41 PM  Admit Dx: Acute pulmonary edema (HCC) [U94.6]  Diastolic CHF, acute (HCC) [I50.31]  Acute respiratory failure with hypoxia (HCC) [J96.01]  Atrial fibrillation, unspecified type ([facility] [I48.91]    Subjective:  Unable to understand simple conversation  Per RN: no new issues    ROS: denies fever, chills, cp, sob, n/v, HA unless stated above.      carvedilol  3.125 mg Oral BID WC    pantoprazole  40 mg Oral QAM AC    piperacillin-tazobactam  3,375 mg IntraVENous Q8H    apixaban  2.5 mg Oral BID    sodium chloride flush  5-40 mL IntraVENous 2 times per day    heparin flush  1 mL IntraVENous 2 times per day    hydrALAZINE  10 mg Oral 3 times per day    amiodarone  200 mg Oral Daily    sodium chloride flush  5-40 mL IntraVENous 2 times per day     sodium chloride flush, 5-40 mL, PRN  sodium chloride, , PRN  heparin flush, 1 mL, PRN  sodium chloride flush, 5-40 mL, PRN  sodium chloride, , PRN  ondansetron, 4 mg, Q8H PRN   Or  ondansetron, 4 mg, Q6H PRN  polyethylene glycol, 17 g, Daily PRN  acetaminophen, 650 mg, Q6H PRN   Or  acetaminophen, 650 mg, Q6H PRN  perflutren lipid microspheres, 1.5 mL, ONCE PRN       Objective:    /68   Pulse 79   Temp 97.6 °F (36.4 °C) (Oral)   Resp 24   Ht 6' (1.829 m) Comment: Estimated  Wt 265 lb (120.2 kg)   SpO2 91%   BMI 35.94 kg/m²   General Appearance: alert and oriented to person, place and time and in no acute distress  Skin: warm and dry  Head: normocephalic and atraumatic  Eyes: pupils equal, round, and reactive to light, extraocular eye movements intact, conjunctivae normal  Neck: neck supple and non tender without mass   Pulmonary/Chest: clear to auscultation bilaterally- no wheezes, rales or rhonchi, normal air movement, no respiratory distress  Cardiovascular: normal rate, normal S1 and S2 and no carotid bruits  Abdomen: soft, non-tender, non-distended, normal bowel sounds, no masses or organomegaly  Extremities: no helped with the following. Patient is socially active. He was last seen by the son last week however was seen by his friends 1 or 2 days ago. He was found on the floor at his home. He was hypoxic. When he presented into the emergency room the emergency room doctor said that he was satting in the 70%. BiPAP was placed approximately 1 to 2 hours prior to my assessment of the patient. He had not been responsive or coherent prior to that. The son discussed CODE STATUS with the ED doctor so the patient is wishing to be a DNR DNI  We are still not able to obtain details as mentioned above and below. During my assessment the son and other family members apparently have left the department    During his ER course he was also given a Oneil on nitro drip a dose of Lasix 80 mg IV. 2D echo 7/22/2022:  Technically sub-optimal images. Normal left ventricular chamber size. Mild global hypokinesis, abnormal septal motion, LV EF 40 +/- 3%. Indeterminate LV diastolic function. Left atrium is moderately enlarged. Severely increased left atrial volume. Interatrial septum not well visualized but appears intact. Right ventricle enlarged with decreased function. Moderately enlarged right atrium. Moderate eccentric mitral regurgitation present. No mitral valve prolapse. No hemodynamically significant aortic stenosis. There is mild aortic regurgitation. There is mild to moderate tricuspid regurgitation. Moderate Pulmonary hypertension, RVSP 62mmHg. Normal aortic root size. No evidence of pericardial effusion. Pleural effusion noted. The inferior vena cava dilated with decreased respiratory variation. No intra cardiac mass or thrombus. No comparison study available. Acute decompensated systolic heart failure causing acute respiratory failure. Clinically responding well to BiPAP. Hold further  Lasix now. See #3. A. fib RVR rate.   Antiarrhythmics adjusted per cardiologist:  [place], hydralazine. High trop: Likely demand ischemia from hypoxic respiratory failure and Rhabdo. Deferring further w/u    Antonio:  no change in crt but inc in bun increase. Life threatening noncompliance. Only takes baby asa at home. 7/22: I thoroughly reasoned with him, empathesized with his concern about becoming a burden to his family. I explained how med compliance would increase his health and decrease chance of debility. He understood but I question his cognitive abilities and insight.  7/23: I discussed the utility of being compliant as I have stated previously above and asked him for feedback. he asked me to repeat. I did more than once. but he did not seem to be able to address or understand these issues even when I confirmed that he is hearing what I am saying.  7/24:  he does not have capacity to make his own decisions at this time  DVT prophylaxis: Eliquis so cardio holding asa  DNR CCA. Disposition likely needs SNF    NOTE: This report was transcribed using voice recognition software. Every effort was made to ensure accuracy; however, inadvertent computerized transcription errors may be present.      Electronically signed by Latanya Cummings MD on 7/24/2022 at 2:51 PM

## 2022-07-24 NOTE — PROGRESS NOTES
07/24/22 1018    sodium chloride flush 0.9 % injection 5-40 mL, 5-40 mL, IntraVENous, 2 times per day, Kianna Ortiz MD, 10 mL at 07/24/22 0904    sodium chloride flush 0.9 % injection 5-40 mL, 5-40 mL, IntraVENous, PRN, Kianna Ortiz MD    0.9 % sodium chloride infusion, , IntraVENous, PRN, Kianna Ortiz MD    ondansetron (ZOFRAN-ODT) disintegrating tablet 4 mg, 4 mg, Oral, Q8H PRN **OR** ondansetron (ZOFRAN) injection 4 mg, 4 mg, IntraVENous, Q6H PRN, Kianna Ortiz MD    polyethylene glycol (GLYCOLAX) packet 17 g, 17 g, Oral, Daily PRN, Kianna Ortiz MD    acetaminophen (TYLENOL) tablet 650 mg, 650 mg, Oral, Q6H PRN **OR** acetaminophen (TYLENOL) suppository 650 mg, 650 mg, Rectal, Q6H PRN, Kianna Ortiz MD    perflutren lipid microspheres (DEFINITY) injection 1.65 mg, 1.5 mL, IntraVENous, ONCE PRN, Kianna Ortiz MD    Physical Exam:  /74   Pulse 70   Temp 97.6 °F (36.4 °C) (Oral)   Resp 18   Ht 6' (1.829 m) Comment: Estimated  Wt 265 lb (120.2 kg)   SpO2 95%   BMI 35.94 kg/m²   Wt Readings from Last 3 Encounters:   07/24/22 265 lb (120.2 kg)     Appearance: Awake, alert, no acute respiratory distress  Skin: Intact, no rash  Head: Normocephalic, atraumatic  Eyes: EOMI, no conjunctival erythema  ENMT: No pharyngeal erythema, MMM, no rhinorrhea  Neck: Supple, no elevated JVP, no carotid bruits  Lungs: Clear to auscultation bilaterally. No wheezes, rales, or rhonchi.   Cardiac: PMI nondisplaced, irregularly irregular rhythm with a normal rate, S1 & S2 normal, no murmurs  Abdomen: Soft, nontender, +bowel sounds  Extremities: Moves all extremities x 4, no lower extremity edema  Neurologic: No focal motor deficits apparent, normal mood and affect  Peripheral Pulses: Intact posterior tibial pulses bilaterally    Intake/Output:    Intake/Output Summary (Last 24 hours) at 7/24/2022 1328  Last data filed at 7/24/2022 1323  Gross per 24 hour   Intake 253.02 ml   Output 1900 ml   Net -1646.98 ml     I/O this shift:  In: -   Out: 950 [Urine:950]    Laboratory Tests:  Recent Labs     07/22/22  0421 07/23/22  0520 07/24/22  0514    142 138   K 4.2 4.0 4.3   * 108* 105   CO2 20* 25 27   BUN 47* 53* 55*   CREATININE 2.0* 2.0* 1.9*   GLUCOSE 139* 103* 103*   CALCIUM 7.8* 7.4* 7.6*     Lab Results   Component Value Date/Time    MG 2.2 07/24/2022 05:14 AM     Recent Labs     07/21/22  1410 07/23/22  0520 07/24/22  0514   ALKPHOS 100 79 76   ALT 32 30 30   AST 95* 72* 51*   PROT 6.4 5.2* 5.3*   BILITOT 1.3* 0.7 0.7   LABALBU 3.5 2.5* 2.7*     Recent Labs     07/22/22  0421 07/23/22  0520 07/24/22  0514   WBC 13.1* 9.0 8.5   RBC 4.47 4.24 4.16   HGB 13.3 12.3* 12.5   HCT 42.7 40.2 39.9   MCV 95.5 94.8 95.9   MCH 29.8 29.0 30.0   MCHC 31.1* 30.6* 31.3*   RDW 16.5* 16.5* 16.2*    189 195   MPV 9.6 10.1 9.2     Lab Results   Component Value Date    CKTOTAL 3,244 (H) 07/21/2022     No results found for: INR, PROTIME  Lab Results   Component Value Date    TSH 1.550 07/22/2022     No results found for: LABA1C  No results found for: EAG  No results found for: CHOL  No results found for: TRIG  No results found for: HDL  No results found for: LDLCALC, LDLCHOLESTEROL  No results found for: LABVLDL, VLDL  No results found for: [facility]  Recent Labs     07/21/22  1410   PROBNP 10,854*       Cardiac Tests:  Echocardiogram from 7/22/2022:   Technically sub-optimal images. Normal left ventricular chamber size. Mild global hypokinesis, abnormal septal motion, LV EF 40 +/- 3%. Indeterminate LV diastolic function. Severely increased left atrial volume. Interatrial septum not well visualized but appears intact. Right ventricle enlarged with decreased function. Moderately enlarged right atrium. Moderate eccentric mitral regurgitation present. No mitral valve prolapse. No hemodynamically significant aortic stenosis. There is mild aortic regurgitation.    There is mild to moderate tricuspid regurgitation. Moderate Pulmonary hypertension, RVSP 62mmHg. Normal aortic root size. No evidence of pericardial effusion. Pleural effusion noted. The inferior vena cava dilated with decreased respiratory variation. No intra cardiac mass or thrombus. No comparison study available. IMPRESSION / RECOMMENDATIONS:     Acute Heart Failure -new onset systolic heart failure pending his Echo - Continue Lasix as his BP, renal Fn tolerates. He is hypervolemic on examination. Recommend Lasix 40 IV twice daily. Check BNP. Monitor daily Wts, I/Os, BP, renal Fn  He does have chronic renal failure. Currently, he is on Coreg and hydralazine. Unable to use ACE or Arni on account of renal function. Unable to use Aldactone for the same reason. Patient is currently DNR and does not want any further interventions. Acute hypoxic respiratory failure - Per Pulmonary/Critical care     Elevated Troponin - Likely demand ischemia from hypoxic respiratory failure and Rhabdo - no CP, EKG reviewed - No further cardiac testing as described above. A Fb with RVR - New. Rate control with Amiodarone and BB. Monitor BP and HR - Started on adjusted dose Eliquis for RatherGather -Risk benefits of RatherGather d/w him and his son -Agreeable     CAD s/p CABG - >20 yrs ago. Add low dose BB; Hold his home ASA due to Eliquis, No statin due to his age and he \"didn't want to take it in the past\"     Delores vs CKD - Monitor renal Fn     Moderate obesity    I will sign off today. He should follow-up with cardiology office in 2 weeks or so after discharge. Please do not hesitate to call us with any questions or concerns. Mohan Tsai MD, [facility]    NOTE: This report was transcribed using voice recognition software. Every effort was made to ensure accuracy; however, inadvertent computerized transcription errors may be present.

## 2022-07-24 NOTE — PROGRESS NOTES
Physical Therapy    Physical Therapy Initial Evaluation/Plan of Care    Room #:  YY72/PD00-49  Patient Name: Mayur Seaman  YOB: 1933  MRN: [id number]    Date of Service: 7/24/2022     Tentative placement recommendation: Subacute rehab  Equipment recommendation: To be determined      Evaluating Physical Therapist: Amadeo Echeverria  #[id number]      Specific Provider Orders/Date/Referring Provider :  07/23/22 1315    PT eval and treat  Start:  07/23/22 1315,   End:  07/23/22 1315,   ONE TIME,   Standing Count:  1 Occurrences,   Corby Rose MD     Admitting Diagnosis:   Acute pulmonary edema ([address] [E00.4]  Diastolic CHF, acute ([address] [I50.31]  Acute respiratory failure with hypoxia ([address] [J96.01]  Atrial fibrillation, unspecified type ([address] [I48.91]    Admitted with  fall at home unresponsive   Surgery: none  Visit Diagnoses         Codes    Acute pulmonary edema ([address]    -  Primary J81.0    Acute respiratory failure with hypoxia ([address]     J96.01    Atrial fibrillation, unspecified type ([address]     I48.91            Patient Active Problem List   Diagnosis    Diastolic CHF, acute ([address]    Acute pulmonary edema (HCC)        ASSESSMENT of Current Deficits Patient exhibits decreased strength, balance, and endurance impairing functional mobility, transfers, gait , gait distance, and tolerance to activity are barriers to d/c and require skilled intervention during hospital stay to attain pre hospital level of function. Decreased strength, balance and endurance  increases patient's risk for fall.   Slight confusion       PHYSICAL THERAPY  PLAN OF CARE       Physical therapy plan of care is established based on physician order,  patient diagnosis and clinical assessment    Current Treatment Recommendations:    -Bed Mobility: Lower extremity exercises , Upper extremity exercises , and Trunk control activities   -Sitting Balance: Incorporate reaching activities to activate trunk muscles , Facilitate active trunk muscle engagement , and Facilitate postural control in all planes   -Standing Balance: Perform strengthening exercises in standing to promote motor control with or without upper extremity support  and Instruct patient on adequate base of support to maintain balance  -Transfers: Provide instruction on proper hand and foot position for adequate transfer of weight onto lower extremities and use of gait device if needed and Cues for hand placement, technique and safety. Provide stabilization to prevent fall   -Gait: Gait training and Standing activities to improve: base of support, weight shift, weight bearing    -Endurance: Utilize Supervised activities to increase level of endurance to allow for safe functional mobility including transfers and gait     PT long term treatment goals are located in below grid    Patient and or family understand(s) diagnosis, prognosis, and plan of care. Frequency of treatments: Patient will be seen  daily. Prior Level of Function: Patient ambulated independently    Rehab Potential: good   for baseline    Past medical history:   No past medical history on file. No past surgical history on file.     SUBJECTIVE:    Precautions: , falls, alarm, and O2 , new 2 Liters of o2 via nasal cannula     Social history: Patient lives alone in a ranch home  with 10 steps  to enter with Rail  Sponge bathes grab bars    Equipment owned: None,       [address] [facility]   How much difficulty turning over in bed?: A Lot  How much difficulty sitting down on / standing up from a chair with arms?: A Lot  How much difficulty moving from lying on back to sitting on side of bed?: A Lot  How much help from another person moving to and from a bed to a chair?: A Lot  How much help from another person needed to walk in hospital room?: Total  How much help from another person for climbing 3-5 steps with a railing?: Total  AM-PAC Inpatient Mobility Raw Score : 10  AM-PAC Inpatient T-Scale Score : 32.29  Mobility Inpatient CMS 0-100% Score: 76.75  Mobility Inpatient CMS G-Code Modifier : CL    Nursing cleared patient for PT evaluation. The admitting diagnosis and active problem list as listed above have been reviewed prior to the initiation of this evaluation. OBJECTIVE;   Initial Evaluation  Date: 7/24/2022 Treatment Date:     Short Term/ Long Term   Goals   Was pt agreeable to Eval/treatment? Yes  To be met in 5 days   Pain level   0/10        Bed Mobility    Rolling:  Moderate assist of 1    Supine to sit: Maximal assist of 1    Sit to supine: Maximal assist of 1    Scooting: Maximal assist of 1    Rolling: Minimal assist of 1    Supine to sit: Minimal assist of 1    Sit to supine: Minimal assist of 1    Scooting: Minimal assist of 1     Transfers Sit to stand: Maximal assist of 1 with full upright 3rd attempt  Sit to stand: Minimal assist of 1     Ambulation    not assessed     20 feet using  wheeled walker with Moderate assist of 1    ROM impaired d/t swelling  Increase range of motion 10% of affected joints    Strength BUE:   3+/5  RLE:  3/5  LLE:  3/5  Increase strength in affected mm groups by 1/3 grade   Balance Sitting EOB:  fair    Dynamic Standing:  poor    Sitting EOB:  good    Dynamic Standing: fair with wheeled walker      Patient is Alert & Oriented x person, place, time, and situation and follows directions    Sensation:  Patient  denies numbness/tingling   Edema:  yes bilateral lower extremities weeping Left lower extremity   Endurance: poor      Vitals:  2 liters nasal cannula   Blood Pressure at rest 136/68 sup Blood Pressure during session 124/80 sit   Heart Rate at rest 74 Heart Rate during session 79   SPO2 at rest 96%  SPO2 during session 93%     Patient education  Patient educated on role of Physical Therapy, risks of immobility, safety and plan of care,  importance of mobility while in hospital , importance and purpose of adaptive device and adjusted to proper height for the patient. , and safety      Patient response to education:   Pt verbalized understanding Pt demonstrated skill Pt requires further education in this area   Yes Partial Yes      Treatment:  Patient practiced and was instructed/facilitated in the following treatment: Patient   Sat edge of bed 15 minutes with Minimal assist of 1 to increase dynamic sitting balance and activity tolerance. seated and standing challenges      Therapeutic Exercises:  ankle pumps  x 10 reps. At end of session, patient in bed with alarm call light and phone within reach,  all lines and tubes intact, nursing notified. Patient would benefit from continued skilled Physical Therapy to improve functional independence and quality of life. Patient's/ family goals   get stronger    Time in  248  Time out  331    Total Treatment Time  23 minutes    Evaluation time includes thorough review of current medical information, gathering information on past medical history/social history and prior level of function, completion of standardized testing/informal observation of tasks, assessment of data, and development of Plan of care and goals.      CPT codes:  Low Complexity PT evaluation (61894)  Therapeutic activities (67131)   15 minutes  1 unit(s)  Neuromuscular reeducation (43432)   8 minutes  1 unit(s)    Shey Flores, PT

## 2022-07-24 NOTE — PROGRESS NOTES
Pulmonary/Critical Care Progress Note    We are following patient for HFrEF, HFpEF, right ventricular dysfunction, cor pulmonale, pulmonary hypertension, possible NSTEMI, atrial fibrillation never diagnosed before but patient has not been to a physician for many years, urinary tract infection, chronic kidney disease, stasis dermatitis    SUBJECTIVE:  Patient is awake and alert. Chest x-ray today shows no significant change from yesterday's film. There are bilateral pleural effusions and pulmonary vascular congestion probably consistent with both chronic systolic and chronic diastolic heart failure is an exacerbation. The patient's renal function is of some concern with regards to giving large doses of diuretics. Today, we will give 40 mg of furosemide once per day. Because his vital signs have improved, we will resume carvedilol at a reduced dose and restart the small dose of hydralazine so as to avoid ACE inhibitors and ARB's which could further damage his kidneys. Urinary tract infection possible pneumonia being treated appropriately with Zosyn. He has been transitioned to oral amiodarone for his atrial fibrillation and given small dose of apixaban with renal adjustment for appropriate anticoagulation of this elderly gentleman.     MEDICATIONS:   [START ON 7/25/2022] furosemide  40 mg IntraVENous Daily    carvedilol  3.125 mg Oral BID WC    pantoprazole  40 mg Oral QAM AC    piperacillin-tazobactam  3,375 mg IntraVENous Q8H    apixaban  2.5 mg Oral BID    sodium chloride flush  5-40 mL IntraVENous 2 times per day    heparin flush  1 mL IntraVENous 2 times per day    hydrALAZINE  10 mg Oral 3 times per day    amiodarone  200 mg Oral Daily    sodium chloride flush  5-40 mL IntraVENous 2 times per day      sodium chloride Stopped (07/23/22 1625)    sodium chloride       sodium chloride flush, sodium chloride, heparin flush, sodium chloride flush, sodium chloride, ondansetron **OR** ondansetron, polyethylene glycol, acetaminophen **OR** acetaminophen, perflutren lipid microspheres      REVIEW OF SYSTEMS:  Constitutional: Denies fever, weight loss, night sweats, and fatigue  Skin: Denies pigmentation, dark lesions, and rashes   HEENT: Denies hearing loss, tinnitus, ear drainage, epistaxis, sore throat, and hoarseness. Cardiovascular: Denies palpitations, chest pain, and chest pressure. Respiratory: Denies cough, dyspnea at rest, hemoptysis, apnea, and choking. Gastrointestinal: Denies nausea, vomiting, poor appetite, diarrhea, heartburn or reflux  Genitourinary: Denies dysuria, frequency, urgency or hematuria  Musculoskeletal: Denies myalgias, muscle weakness, and bone pain  Neurological: Denies dizziness, vertigo, headache, and focal weakness  Psychological: Denies anxiety and depression  Endocrine: Denies heat intolerance and cold intolerance  Hematopoietic/Lymphatic: Denies bleeding problems and blood transfusions    OBJECTIVE:  Vitals:    07/24/22 1100   BP: 119/70   Pulse: 69   Resp: 20   Temp:    SpO2: 93%     FiO2 : 33 %  O2 Flow Rate (L/min): 2 L/min  O2 Device: Nasal cannula    PHYSICAL EXAM:  Constitutional: No fever, chills, diaphoresis  Skin: Chronic venous stasis changes of lower extremities  HEENT: Unremarkable  Neck: No JVD, lymphadenopathy, thyromegaly  Cardiovascular: S1, S2 irregular, no murmurs or rubs  Respiratory: Inspiratory crackles left greater than right lung base. No wheezing is heard  Gastrointestinal: Soft, obese, nontender  Genitourinary: No CVA tenderness or grossly bloody urine  Extremities: Trace edema feet legs and ankles  Neurological: Awake, alert, oriented 2/3. Appears to move all extremities.   Psychological: Difficult to evaluate at this time    LABS:  WBC   Date Value Ref Range Status   07/24/2022 8.5 4.5 - 11.5 E9/L Final   07/23/2022 9.0 4.5 - 11.5 E9/L Final   07/22/2022 13.1 (H) 4.5 - 11.5 E9/L Final     Hemoglobin   Date Value Ref Range Status   07/24/2022 12.5 12.5 - 16.5 g/dL Final   07/23/2022 12.3 (L) 12.5 - 16.5 g/dL Final   07/22/2022 13.3 12.5 - 16.5 g/dL Final     Hematocrit   Date Value Ref Range Status   07/24/2022 39.9 37.0 - 54.0 % Final   07/23/2022 40.2 37.0 - 54.0 % Final   07/22/2022 42.7 37.0 - 54.0 % Final     MCV   Date Value Ref Range Status   07/24/2022 95.9 80.0 - 99.9 fL Final   07/23/2022 94.8 80.0 - 99.9 fL Final   07/22/2022 95.5 80.0 - 99.9 fL Final     Platelets   Date Value Ref Range Status   07/24/2022 195 130 - 450 E9/L Final   07/23/2022 189 130 - 450 E9/L Final   07/22/2022 218 130 - 450 E9/L Final     Sodium   Date Value Ref Range Status   07/24/2022 138 132 - 146 mmol/L Final   07/23/2022 142 132 - 146 mmol/L Final   07/22/2022 140 132 - 146 mmol/L Final     Potassium   Date Value Ref Range Status   07/24/2022 4.3 3.5 - 5.0 mmol/L Final   07/23/2022 4.0 3.5 - 5.0 mmol/L Final   07/22/2022 4.2 3.5 - 5.0 mmol/L Final     Potassium reflex Magnesium   Date Value Ref Range Status   07/21/2022 5.0 3.5 - 5.0 mmol/L Final     Chloride   Date Value Ref Range Status   07/24/2022 105 98 - 107 mmol/L Final   07/23/2022 108 (H) 98 - 107 mmol/L Final   07/22/2022 110 (H) 98 - 107 mmol/L Final     CO2   Date Value Ref Range Status   07/24/2022 27 22 - 29 mmol/L Final   07/23/2022 25 22 - 29 mmol/L Final   07/22/2022 20 (L) 22 - 29 mmol/L Final     BUN   Date Value Ref Range Status   07/24/2022 55 (H) 6 - 23 mg/dL Final   07/23/2022 53 (H) 6 - 23 mg/dL Final   07/22/2022 47 (H) 6 - 23 mg/dL Final     Creatinine   Date Value Ref Range Status   07/24/2022 1.9 (H) 0.7 - 1.2 mg/dL Final   07/23/2022 2.0 (H) 0.7 - 1.2 mg/dL Final   07/22/2022 2.0 (H) 0.7 - 1.2 mg/dL Final     Glucose   Date Value Ref Range Status   07/24/2022 103 (H) 74 - 99 mg/dL Final   07/23/2022 103 (H) 74 - 99 mg/dL Final   07/22/2022 139 (H) 74 - 99 mg/dL Final     Calcium   Date Value Ref Range Status   07/24/2022 7.6 (L) 8.6 - 10.2 mg/dL Final   07/23/2022 7.4 (L) 8.6 - 10.2 mg/dL Final 07/22/2022 7.8 (L) 8.6 - 10.2 mg/dL Final     Total Protein   Date Value Ref Range Status   07/24/2022 5.3 (L) 6.4 - 8.3 g/dL Final   07/23/2022 5.2 (L) 6.4 - 8.3 g/dL Final   07/21/2022 6.4 6.4 - 8.3 g/dL Final     Albumin   Date Value Ref Range Status   07/24/2022 2.7 (L) 3.5 - 5.2 g/dL Final   07/23/2022 2.5 (L) 3.5 - 5.2 g/dL Final   07/21/2022 3.5 3.5 - 5.2 g/dL Final     Total Bilirubin   Date Value Ref Range Status   07/24/2022 0.7 0.0 - 1.2 mg/dL Final   07/23/2022 0.7 0.0 - 1.2 mg/dL Final   07/21/2022 1.3 (H) 0.0 - 1.2 mg/dL Final     Alkaline Phosphatase   Date Value Ref Range Status   07/24/2022 76 40 - 129 U/L Final   07/23/2022 79 40 - 129 U/L Final   07/21/2022 100 40 - 129 U/L Final     AST   Date Value Ref Range Status   07/24/2022 51 (H) 0 - 39 U/L Final   07/23/2022 72 (H) 0 - 39 U/L Final   07/21/2022 95 (H) 0 - 39 U/L Final     ALT   Date Value Ref Range Status   07/24/2022 30 0 - 40 U/L Final   07/23/2022 30 0 - 40 U/L Final   07/21/2022 32 0 - 40 U/L Final     GFR Non-   Date Value Ref Range Status   07/24/2022 34 >=60 mL/min/1.73 Final     Comment:     Chronic Kidney Disease: less than 60 ml/min/1.73 sq.m. Kidney Failure: less than 15 ml/min/1.73 sq.m. Results valid for patients 18 years and older. 07/23/2022 32 >=60 mL/min/1.73 Final     Comment:     Chronic Kidney Disease: less than 60 ml/min/1.73 sq.m. Kidney Failure: less than 15 ml/min/1.73 sq.m. Results valid for patients 18 years and older. 07/22/2022 32 >=60 mL/min/1.73 Final     Comment:     Chronic Kidney Disease: less than 60 ml/min/1.73 sq.m. Kidney Failure: less than 15 ml/min/1.73 sq.m. Results valid for patients 18 years and older.        GFR    Date Value Ref Range Status   07/24/2022 41  Final   07/23/2022 38  Final   07/22/2022 38  Final     Magnesium   Date Value Ref Range Status   07/24/2022 2.2 1.6 - 2.6 mg/dL Final   07/23/2022 2.2 1.6 - 2.6 mg/dL Final   07/22/2022 2.2 1.6 - 2.6 mg/dL Final     Phosphorus   Date Value Ref Range Status   07/24/2022 3.4 2.5 - 4.5 mg/dL Final   07/23/2022 3.5 2.5 - 4.5 mg/dL Final   07/22/2022 4.2 2.5 - 4.5 mg/dL Final     Recent Labs     07/21/22  1755 07/22/22  0525   PH 7.362  --    PO2 307.9*  --    PCO2 37.2  --    HCO3 20.6* 23.2   BE -4.2* -2.1   O2SAT 99.3* 99.3*       RADIOLOGY:  XR CHEST PORTABLE   Final Result   No significant changes since the July 23. XR CHEST PORTABLE   Final Result   Persistent findings of a decompensated congestive heart failure. No   significant changes since July 21st.         CT Head WO Contrast   Final Result   1. No acute intracranial abnormality. 2. Chronic small vessel ischemic disease and generalized cerebral volume loss. 3. Rather diffuse scalp swelling, right greater than left. XR CHEST PORTABLE   Final Result   Cardiomegaly with pulmonary edema and bilateral small pleural effusions   suggestive of congestive heart failure acute exacerbation. Superimposed   pneumonia, particularly in the left lung base is not excluded. XR CHEST PORTABLE    (Results Pending)           PROBLEM LIST:  Principal Problem:    Diastolic CHF, acute ([address]  Active Problems:    Acute pulmonary edema (HCC)  Resolved Problems:    * No resolved hospital problems. *      IMPRESSION:  HF R EF  HFpEF  Right heart failure/cor pulmonale  Previous atrial fibrillation now converted to sinus rhythm on amiodarone  Bilateral pleural effusions  Possible NSTEMI versus demand ischemia  Stasis dermatitis  Chronic kidney disease  Urinary tract infection    PLAN:  Furosemide 40 mg once today only with daily adjustments  Follow renal indices carefully  Now that vital signs have improved, will restart carvedilol at a reduced dose 3.125 mg p.o. twice daily and hydralazine 10 mg p.o. every 8 hours  Avoid nephrotoxic drugs including ACE inhibitor's and ARB's  Chest x-ray, labs in a.m.   Soft diet  Continue antibiotics including Zosyn  Await urine culture  Do not want to transfer to floor quite    ATTESTATION:  ICU Staff Physician note of personal involvement in Care  As the attending physician, I certify that I personally reviewed the patients history and personally examined the patient to confirm the physical findings described above,  And that I reviewed the relevant imaging studies and available reports. I also discussed the differential diagnosis and all of the proposed management plans with the patient and individuals accompanying the patient to this visit. They had the opportunity to ask questions about the proposed management plans and to have those questions answered. This patient has a high probability of sudden, clinically significant deterioration, which requires the highest level of physician preparedness to intervene urgently. I managed/supervised life or organ supporting interventions that required frequent physician assessment. I devoted my full attention to the direct care of this patient for the amount of time indicated below. Time I spent with the family or surrogate(s) is included only if the patient was incapable of providing the necessary information or participating in medical decisions - Time devoted to teaching and to any procedures I billed separately is not included.     CRITICAL CARE TIME:  36 minutes    Electronically signed by Diana Landaverde MD on 7/24/2022 at 11:23 AM

## 2022-07-25 ENCOUNTER — APPOINTMENT (OUTPATIENT)
Dept: GENERAL RADIOLOGY | Age: 87
DRG: 291 | End: 2022-07-25
Payer: MEDICARE

## 2022-07-25 LAB
ALBUMIN SERPL-MCNC: 2.5 G/DL (ref 3.5–5.2)
ALP BLD-CCNC: 83 U/L (ref 40–129)
ALT SERPL-CCNC: 26 U/L (ref 0–40)
ANION GAP SERPL CALCULATED.3IONS-SCNC: 8 MMOL/L (ref 7–16)
AST SERPL-CCNC: 40 U/L (ref 0–39)
BASOPHILS ABSOLUTE: 0.01 E9/L (ref 0–0.2)
BASOPHILS RELATIVE PERCENT: 0.1 % (ref 0–2)
BILIRUB SERPL-MCNC: 0.9 MG/DL (ref 0–1.2)
BUN BLDV-MCNC: 51 MG/DL (ref 6–23)
CALCIUM SERPL-MCNC: 7.5 MG/DL (ref 8.6–10.2)
CHLORIDE BLD-SCNC: 106 MMOL/L (ref 98–107)
CO2: 27 MMOL/L (ref 22–29)
CREAT SERPL-MCNC: 1.5 MG/DL (ref 0.7–1.2)
EOSINOPHILS ABSOLUTE: 0.24 E9/L (ref 0.05–0.5)
EOSINOPHILS RELATIVE PERCENT: 3.4 % (ref 0–6)
GFR AFRICAN AMERICAN: 53
GFR NON-AFRICAN AMERICAN: 44 ML/MIN/1.73
GLUCOSE BLD-MCNC: 98 MG/DL (ref 74–99)
HCT VFR BLD CALC: 39.6 % (ref 37–54)
HEMOGLOBIN: 12.3 G/DL (ref 12.5–16.5)
IMMATURE GRANULOCYTES #: 0.03 E9/L
IMMATURE GRANULOCYTES %: 0.4 % (ref 0–5)
LYMPHOCYTES ABSOLUTE: 1.15 E9/L (ref 1.5–4)
LYMPHOCYTES RELATIVE PERCENT: 16.2 % (ref 20–42)
MAGNESIUM: 2.1 MG/DL (ref 1.6–2.6)
MCH RBC QN AUTO: 29.6 PG (ref 26–35)
MCHC RBC AUTO-ENTMCNC: 31.1 % (ref 32–34.5)
MCV RBC AUTO: 95.2 FL (ref 80–99.9)
MONOCYTES ABSOLUTE: 0.79 E9/L (ref 0.1–0.95)
MONOCYTES RELATIVE PERCENT: 11.1 % (ref 2–12)
NEUTROPHILS ABSOLUTE: 4.88 E9/L (ref 1.8–7.3)
NEUTROPHILS RELATIVE PERCENT: 68.8 % (ref 43–80)
PDW BLD-RTO: 16.2 FL (ref 11.5–15)
PHOSPHORUS: 2.7 MG/DL (ref 2.5–4.5)
PLATELET # BLD: 212 E9/L (ref 130–450)
PMV BLD AUTO: 9.6 FL (ref 7–12)
POTASSIUM SERPL-SCNC: 3.9 MMOL/L (ref 3.5–5)
RBC # BLD: 4.16 E12/L (ref 3.8–5.8)
SODIUM BLD-SCNC: 141 MMOL/L (ref 132–146)
TOTAL PROTEIN: 5.4 G/DL (ref 6.4–8.3)
WBC # BLD: 7.1 E9/L (ref 4.5–11.5)

## 2022-07-25 PROCEDURE — 2580000003 HC RX 258: Performed by: NURSE PRACTITIONER

## 2022-07-25 PROCEDURE — 2700000000 HC OXYGEN THERAPY PER DAY

## 2022-07-25 PROCEDURE — 83735 ASSAY OF MAGNESIUM: CPT

## 2022-07-25 PROCEDURE — 99232 SBSQ HOSP IP/OBS MODERATE 35: CPT | Performed by: INTERNAL MEDICINE

## 2022-07-25 PROCEDURE — 6360000002 HC RX W HCPCS: Performed by: NURSE PRACTITIONER

## 2022-07-25 PROCEDURE — 2580000003 HC RX 258: Performed by: INTERNAL MEDICINE

## 2022-07-25 PROCEDURE — 92610 EVALUATE SWALLOWING FUNCTION: CPT | Performed by: SPEECH-LANGUAGE PATHOLOGIST

## 2022-07-25 PROCEDURE — 84100 ASSAY OF PHOSPHORUS: CPT

## 2022-07-25 PROCEDURE — 80053 COMPREHEN METABOLIC PANEL: CPT

## 2022-07-25 PROCEDURE — 6360000002 HC RX W HCPCS: Performed by: INTERNAL MEDICINE

## 2022-07-25 PROCEDURE — 6370000000 HC RX 637 (ALT 250 FOR IP): Performed by: INTERNAL MEDICINE

## 2022-07-25 PROCEDURE — 36592 COLLECT BLOOD FROM PICC: CPT

## 2022-07-25 PROCEDURE — 71045 X-RAY EXAM CHEST 1 VIEW: CPT

## 2022-07-25 PROCEDURE — 2000000000 HC ICU R&B

## 2022-07-25 PROCEDURE — 85025 COMPLETE CBC W/AUTO DIFF WBC: CPT

## 2022-07-25 RX ORDER — HYDRALAZINE HYDROCHLORIDE 25 MG/1
25 TABLET, FILM COATED ORAL EVERY 8 HOURS SCHEDULED
Status: DISCONTINUED | OUTPATIENT
Start: 2022-07-25 | End: 2022-07-28 | Stop reason: HOSPADM

## 2022-07-25 RX ORDER — FUROSEMIDE 10 MG/ML
20 INJECTION INTRAMUSCULAR; INTRAVENOUS DAILY
Status: DISCONTINUED | OUTPATIENT
Start: 2022-07-25 | End: 2022-07-27

## 2022-07-25 RX ORDER — FUROSEMIDE 10 MG/ML
20 INJECTION INTRAMUSCULAR; INTRAVENOUS ONCE
Status: DISCONTINUED | OUTPATIENT
Start: 2022-07-25 | End: 2022-07-25

## 2022-07-25 RX ADMIN — PIPERACILLIN AND TAZOBACTAM 3375 MG: 3; .375 INJECTION, POWDER, LYOPHILIZED, FOR SOLUTION INTRAVENOUS at 08:55

## 2022-07-25 RX ADMIN — PIPERACILLIN AND TAZOBACTAM 3375 MG: 3; .375 INJECTION, POWDER, LYOPHILIZED, FOR SOLUTION INTRAVENOUS at 17:18

## 2022-07-25 RX ADMIN — Medication 10 ML: at 21:33

## 2022-07-25 RX ADMIN — Medication 10 ML: at 08:40

## 2022-07-25 RX ADMIN — HEPARIN SODIUM (PORCINE) LOCK FLUSH IV SOLN 100 UNIT/ML 100 UNITS: 100 SOLUTION at 14:17

## 2022-07-25 RX ADMIN — AMIODARONE HYDROCHLORIDE 200 MG: 200 TABLET ORAL at 08:44

## 2022-07-25 RX ADMIN — PANTOPRAZOLE SODIUM 40 MG: 40 TABLET, DELAYED RELEASE ORAL at 06:37

## 2022-07-25 RX ADMIN — HYDRALAZINE HYDROCHLORIDE 25 MG: 25 TABLET, FILM COATED ORAL at 21:31

## 2022-07-25 RX ADMIN — APIXABAN 2.5 MG: 2.5 TABLET, FILM COATED ORAL at 08:44

## 2022-07-25 RX ADMIN — HEPARIN 100 UNITS: 100 SYRINGE at 08:40

## 2022-07-25 RX ADMIN — HEPARIN 100 UNITS: 100 SYRINGE at 21:32

## 2022-07-25 RX ADMIN — PIPERACILLIN AND TAZOBACTAM 3375 MG: 3; .375 INJECTION, POWDER, LYOPHILIZED, FOR SOLUTION INTRAVENOUS at 01:02

## 2022-07-25 RX ADMIN — Medication 10 ML: at 21:32

## 2022-07-25 RX ADMIN — FUROSEMIDE 20 MG: 10 INJECTION, SOLUTION INTRAMUSCULAR; INTRAVENOUS at 11:00

## 2022-07-25 RX ADMIN — SODIUM CHLORIDE, PRESERVATIVE FREE 10 ML: 5 INJECTION INTRAVENOUS at 14:16

## 2022-07-25 RX ADMIN — CARVEDILOL 3.12 MG: 3.12 TABLET, FILM COATED ORAL at 08:44

## 2022-07-25 RX ADMIN — HYDRALAZINE HYDROCHLORIDE 25 MG: 25 TABLET, FILM COATED ORAL at 13:39

## 2022-07-25 RX ADMIN — CARVEDILOL 3.12 MG: 3.12 TABLET, FILM COATED ORAL at 17:23

## 2022-07-25 ASSESSMENT — PAIN SCALES - GENERAL: PAINLEVEL_OUTOF10: 0

## 2022-07-25 NOTE — DISCHARGE INSTR - COC
Continuity of Care Form    Patient Name: Ananth Philippe   :  1933  MRN:  [id number]    Admit date:  2022  Discharge date:  2022    Code Status Order: DNR-CCA   Advance Directives:     Admitting Physician:  Jose Garay MD  PCP: No primary care provider on file. Discharging Nurse: Aileen Rodriguez [facility] Unit/Room#: [address] [facility] Unit Phone Number: [phone]    Emergency Contact:   Extended Emergency Contact Information  Primary Emergency Contact: [address] Phone: [phone]  Mobile Phone: [phone]  Relation: Child  Preferred language: English   needed? No  Secondary Emergency Contact: [address]  Mobile Phone: [phone]  Relation: Daughter-in-Law  Preferred language: English   needed? No    Past Surgical History:  No past surgical history on file. Immunization History: There is no immunization history on file for this patient.     Active Problems:  Patient Active Problem List   Diagnosis Code    Diastolic CHF, acute (formerly [facility]) I50.31    Acute pulmonary edema (formerly [facility]) J81.0       Isolation/Infection:   Isolation            No Isolation          Patient Infection Status       Infection Onset Added Last Indicated Last Indicated By Review Planned Expiration Resolved Resolved By    None active    Resolved    COVID-19 (Rule Out) 22 SARS-CoV-2 NAAT (Rapid) (Ordered)   22 Rule-Out Test Resulted            Nurse Assessment:  Last Vital Signs: /70   Pulse 81   Temp 98.8 °F (37.1 °C) (Axillary)   Resp 23   Ht 6' (1.829 m) Comment: Estimated  Wt 259 lb 9.6 oz (117.8 kg)   SpO2 (!) 87%   BMI 35.21 kg/m²     Last documented pain score (0-10 scale): Pain Level: 0  Last Weight:   Wt Readings from Last 1 Encounters:   22 259 lb 9.6 oz (117.8 kg)     Mental Status:  disoriented and alert    IV Access:  - None    Nursing Mobility/ADLs:  Walking   Dependent  Transfer  Dependent  Bathing Dependent  Dressing  Dependent  Toileting  Dependent  Feeding  Assisted  Med Admin  Assisted  Med Delivery   whole    Wound Care Documentation and Therapy:  Wound 07/24/22 Back Medial;Upper skin tear (Active)   Wound Etiology Skin Tear 07/25/22 0800   Dressing Status Clean;Dry; Intact 07/25/22 0800   Wound Cleansed Cleansed with saline 07/24/22 0630   Dressing/Treatment Foam 07/24/22 0630   Dressing Change Due 07/27/22 07/24/22 0630   Wound Length (cm) 8 cm 07/24/22 0630   Wound Width (cm) 5 cm 07/24/22 0630   Wound Depth (cm) 0 cm 07/24/22 0630   Wound Surface Area (cm^2) 40 cm^2 07/24/22 0630   Wound Volume (cm^3) 0 cm^3 07/24/22 0630   Wound Assessment Scanlon/red;Superficial 07/24/22 0630   Drainage Amount None 07/24/22 0630   Odor None 07/24/22 0630   Valerie-wound Assessment Warm; Intact 07/24/22 0630   Margins Attached edges 07/24/22 0630   Wound Thickness Description not for Pressure Injury Partial thickness 07/24/22 0630   Number of days: 1        Elimination:  Continence: Bowel: Yes  Bladder: Yes  Urinary Catheter: None   Colostomy/Ileostomy/Ileal Conduit: No       Date of Last BM: 7/28/2022    Intake/Output Summary (Last 24 hours) at 7/25/2022 1202  Last data filed at 7/25/2022 0532  Gross per 24 hour   Intake 97.69 ml   Output 2840 ml   Net -2742.31 ml     I/O last 3 completed shifts: In: 350.7 [I.V.:24.6; IV Piggyback:326.1]  Out: 5258 [Urine:4225]    Safety Concerns: At Risk for Falls    Impairments/Disabilities:      None    Nutrition Therapy:  Current Nutrition Therapy:   - Oral Diet:  Cardiac    Routes of Feeding: Oral  Liquids: Nectar Thick Liquids  Daily Fluid Restriction: yes - amount 2000  Last Modified Barium Swallow with Video (Video Swallowing Test): not done    Treatments at the Time of Hospital Discharge:   Respiratory Treatments: ***  Oxygen Therapy:  is not on home oxygen therapy.   Ventilator:    - No ventilator support    Rehab Therapies: Physical Therapy and Occupational Therapy  Weight Bearing Status/Restrictions: No weight bearing restrictions  Other Medical Equipment (for information only, NOT a DME order):  walker and hospital bed  Other Treatments: ***    Patient's personal belongings (please select all that are sent with patient):  Andrea Maynard RN SIGNATURE:  {Esignature:[id number]}    CASE MANAGEMENT/SOCIAL WORK SECTION    Inpatient Status Date: 7/21/2022    Readmission Risk Assessment Score:  Readmission Risk              Risk of Unplanned Readmission:  [id number]           Discharging to Facility/ Agency   Name: [address], [place], [address]  Phone: [phone]      Dialysis Facility (if applicable)   Name:  Address:  Dialysis Schedule:  Phone:  Fax:    / signature: Electronically signed by Marty Sesay RN-BC on 7/25/2022 at 12:03 PM      PHYSICIAN SECTION    Prognosis: Good    Condition at Discharge: Stable    Rehab Potential (if transferring to Rehab): Good    Recommended Labs or Other Treatments After Discharge:     Physician Certification: I certify the above information and transfer of Barbara Friend  is necessary for the continuing treatment of the diagnosis listed and that he requires [facility] for less 30 days. Update Admission H&P: {CHP DME Changes in HETKR:[id number]}    PHYSICIAN SIGNATURE:  {Esignature:[id number]}                           HEART FAILURE  / CONGESTIVE HEART FAILURE  DISCHARGE INSTRUCTIONS:  GUIDELINES TO FOLLOW AT HOME    No future appointments. Self- Managed Care:     MEDICATIONS:  Take your medication as directed. If you are experiencing any side effects, inform your doctor, Do not stop taking any of your medications without letting your doctor know. Check with your doctor before taking any over-the-counter medications / herbal / or dietary supplements. They may interfere with your other medications.   Do not take ibuprofen (Advil or Motrin) and naproxen (Aleve) without talking to your doctor first. They could make your heart failure worse. WEIGHT MONITORING:   Weigh yourself everyday (with the same scale) around the same time of the day and write it down. (you can chart them on a calendar or keep track of them on paper. Notify your doctor of a weight gain of 3 pounds or more in 1 day   OR a total of 5 pounds or more in 1 week    Take your weight record to your doctor visits  Also, the same goes if you loose more than 3# in one day, let your heart doctor know. DIET:   Cardiac heart healthy diet- Low saturated / low trans fat, no added salt, caffeine restricted, Low sodium diet-   No more than 2,000mg (2 grams) of salt / sodium per day (which equals to a little less than  a teaspoon of salt)  If your doctor wants you on a fluid restriction. ..it is usually recommended a fluid limit of 2,000cc -  Fluid restriction- 2,000 ml (milliliters) = 64 ounces = you can have 8 glasses of fluid per day (each glass 8 ounces)    Follow a low salt diet - avoid using salt at the table, avoid / limit use of canned soups, processed / packaged foods, salted snacks, olives and pickles. Do not use a salt substitute without checking with your doctor, they may contain a high amount of potassioum. (Mrs. Marjorie Kellogg is safe to use). Limit the use of alcohol       CALL YOUR DOCTOR THE FIRST DAY YOU NOTICE ANY OF THESE   SYMPTOMS:  You have a weight gain of 3 pounds or more in 1 day         OR 5 pounds or more in one week  More shortness of breath  More swelling of your stomach, legs, ankles or feet  Feeling more tired, No energy  Dry hacky cough  Dizziness  More chest pain / discomfort       (CALL 911 IF ANY OF THE FOLLOWING OCCURS  Chest pain (not relieved with nitroglycerine, if you have been prescribed this medication)  Severe shortness of breath  Faint / Pass out  Confusion / cannot think clearly  If symptoms get worse           SMOKING - TOBACCO USE:  * IF YOU SMOKE - STOP! Kick the habit. [address] Program is offered at [facility] [address] and [address] [facility]. Call [phone] extension [phone] for more information. ACTIVITY:   (Ask your doctor when you will be able to return to work and before starting any exercise program.  Do not drive unless unless your doctor has given you permission to do so). Start light exercise. Even if you can only do a small amount, exercise will help you get stronger, have more energy, help manage your weight and decrease  stress. Walking is an easy way to get exercise. Start out slowly and  increase the amount you walk as tolerated  If you become short of breath, dizzy or have chest pain; stop, sit down, and rest.  If you feel \"wiped out\" the day after you exercise, walk at a slower pace or for a shorter distance. You can gradually increase the pace or amount of time. (Do not exercise right after a meal or in extreme temperatures, such as above 85 degrees, if the air is really humid, or wind chill is less than 20 degrees)                                             ADDITIONAL INFORMATION:  Avoid getting sick from colds and the flu. Stay away from friends or family that you know may have a contagious illness  Get plenty of rest   Get a flu shot each year. Get a pneumococcal vaccine shot. If you have had one before, ask your doctor whether you need another dose. My Goal for Self-management of Heart Failure Includes 5 steps :    1. Notice a change in symptoms ( weight gain, short of breath, leg swelling, decreased activity level, bloating. ...)    2. Evaluate the change: (use the Heart Failure Zones )     3. Decide to take action: decide what your options are, such as: (call your doctor for an extra visit, take a prescribed medication, such as your water pill if your doctor has given you directions to do so, [address])    4.  Come up with a strategy:  (now you call the doctor for advice / appointment. This is where you take action!!! Do not wait, catch the symptom early and treat it before it worsens. 5. Evaluate the response: The next day, check your Heart Failure Zones: are you in the GREEN ZONE (safe zone)? Worsening symptoms of YELLOW ZONE? Or have you moved to the RED ZONE and need to call 911 or go to the Emergency Room for evaluation? Call your doctor's office to update them on your symptoms of heart failure. Learning About Heart Failure Zones  What are heart failure zones? Heart failure zones give you an easy way to see changes in your heart failure symptoms. They also tell you when you need to get help. Check every day to see which zone you are in. Green zone. You are doing well. This is where you want to be. Your weight is stable. It's not going up or down. You breathe easily. You are sleeping well. You are able to lie flat without shortness of breath. You can do your usual activities. Yellow zone. Be careful. Your symptoms are changing. Call your doctor. You have new or increased shortness of breath. You are dizzy or lightheaded, or you feel like you may faint. You have sudden weight gain, such as more than 2 to 3 pounds in a day or 5 pounds in a week. (Your doctor may suggest a different range of weight gain.)  You have increased swelling in your legs, ankles, or feet. You are so tired or weak that you can't do your usual activities. You are not sleeping well. Shortness of breath wakes you up at night. You need extra pillows. Red zone. 911  This is an emergency. Call . You have symptoms of sudden heart failure. For example: You have severe trouble breathing. You cough up pink, foamy mucus. You have a new irregular or fast heartbeat. You have symptoms of a heart attack. These may include:  Chest pain or pressure, or a strange feeling in the chest.  Sweating. Shortness of breath. Nausea or vomiting.   Pain, pressure, or a strange

## 2022-07-25 NOTE — PROGRESS NOTES
Pulmonary/Critical Care Progress Note    We are following patient for HFrEF HFpEF, right ventricular dysfunction, cor pulmonale, pulmonary hypertension, possible NSTEMI, left pleural effusion greater than right pleural effusion, atrial fibrillation which is new to him, urinary tract infection CA (improving), stasis dermatitis of lower extremities    SUBJECTIVE:  Patient is doing quite well. However, there is still a significant amount of peripheral edema and the left pleural effusion probably requires being tapped. Discussed this with the patient and he is agreeable to a left thoracentesis which we will arrange for tomorrow with putting a hold on his Eliquis today. We will check clotting studies in the morning. Additionally, the patient's BUN/creatinine have improved and blood pressure has stabilized so that we can increase his hydralazine to 25 mg every 8 hours. He is still at risk for CA so we should hold ARB's/ACE inhibitors. Heart rate is in the 60s currently on small dose of beta-blockers so that he should be able to tolerate hydralazine without difficulty and an increased dose.     MEDICATIONS:   furosemide  20 mg IntraVENous Daily    carvedilol  3.125 mg Oral BID WC    pantoprazole  40 mg Oral QAM AC    piperacillin-tazobactam  3,375 mg IntraVENous Q8H    [Held by provider] apixaban  2.5 mg Oral BID    sodium chloride flush  5-40 mL IntraVENous 2 times per day    heparin flush  1 mL IntraVENous 2 times per day    hydrALAZINE  10 mg Oral 3 times per day    amiodarone  200 mg Oral Daily    sodium chloride flush  5-40 mL IntraVENous 2 times per day      sodium chloride Stopped (07/23/22 2205)    sodium chloride       sodium chloride flush, sodium chloride, heparin flush, sodium chloride flush, sodium chloride, ondansetron **OR** ondansetron, polyethylene glycol, acetaminophen **OR** acetaminophen, perflutren lipid microspheres      REVIEW OF SYSTEMS:  Constitutional: Denies fever, weight loss, night sweats, and fatigue  Skin: Denies pigmentation, dark lesions, and rashes   HEENT: Denies hearing loss, tinnitus, ear drainage, epistaxis, sore throat, and hoarseness. Cardiovascular: Denies palpitations, chest pain, and chest pressure. Respiratory: Denies cough, dyspnea at rest, hemoptysis, apnea, and choking. Gastrointestinal: Denies nausea, vomiting, poor appetite, diarrhea, heartburn or reflux  Genitourinary: Denies dysuria, frequency, urgency or hematuria  Musculoskeletal: Denies myalgias, muscle weakness, and bone pain  Neurological: Denies dizziness, vertigo, headache, and focal weakness  Psychological: Denies anxiety and depression  Endocrine: Denies heat intolerance and cold intolerance  Hematopoietic/Lymphatic: Denies bleeding problems and blood transfusions    OBJECTIVE:  Vitals:    07/25/22 0900   BP: 119/71   Pulse: 88   Resp: 23   Temp:    SpO2: 95%     FiO2 : 33 %  O2 Flow Rate (L/min): 1 L/min  O2 Device: Nasal cannula    PHYSICAL EXAM:  Constitutional: No fever, chills, diaphoresis  Skin: Skin rash, no skin breakdown. However, venous stasis changes are present in both lower extremities  HEENT: Unremarkable  Neck: No JVD, lymphadenopathy, thyromegaly  Cardiovascular: S1, S2 irregular. Grade 1/6 to 2/6 systolic ejection murmur at left sternal border. I do not hear an S3  Respiratory: Inspiratory crackles at both posterior lower lung fields. Slight dullness to percussion on the left. Gastrointestinal: Soft, obese, nontender  Genitourinary: No CVA tenderness  Extremities: 1-2+ edema of feet legs and ankles  Neurological: Awake, alert, oriented x3. No evidence of focal motor or sensory deficits  Psychological: In good spirits. Feeling fairly well.   No evidence of depression    LABS:  WBC   Date Value Ref Range Status   07/25/2022 7.1 4.5 - 11.5 E9/L Final   07/24/2022 8.5 4.5 - 11.5 E9/L Final   07/23/2022 9.0 4.5 - 11.5 E9/L Final     Hemoglobin   Date Value Ref Range Status   07/25/2022 12.3 (L) 12.5 - 16.5 g/dL Final   07/24/2022 12.5 12.5 - 16.5 g/dL Final   07/23/2022 12.3 (L) 12.5 - 16.5 g/dL Final     Hematocrit   Date Value Ref Range Status   07/25/2022 39.6 37.0 - 54.0 % Final   07/24/2022 39.9 37.0 - 54.0 % Final   07/23/2022 40.2 37.0 - 54.0 % Final     MCV   Date Value Ref Range Status   07/25/2022 95.2 80.0 - 99.9 fL Final   07/24/2022 95.9 80.0 - 99.9 fL Final   07/23/2022 94.8 80.0 - 99.9 fL Final     Platelets   Date Value Ref Range Status   07/25/2022 212 130 - 450 E9/L Final   07/24/2022 195 130 - 450 E9/L Final   07/23/2022 189 130 - 450 E9/L Final     Sodium   Date Value Ref Range Status   07/25/2022 141 132 - 146 mmol/L Final   07/24/2022 138 132 - 146 mmol/L Final   07/23/2022 142 132 - 146 mmol/L Final     Potassium   Date Value Ref Range Status   07/25/2022 3.9 3.5 - 5.0 mmol/L Final   07/24/2022 4.3 3.5 - 5.0 mmol/L Final   07/23/2022 4.0 3.5 - 5.0 mmol/L Final     Potassium reflex Magnesium   Date Value Ref Range Status   07/21/2022 5.0 3.5 - 5.0 mmol/L Final     Chloride   Date Value Ref Range Status   07/25/2022 106 98 - 107 mmol/L Final   07/24/2022 105 98 - 107 mmol/L Final   07/23/2022 108 (H) 98 - 107 mmol/L Final     CO2   Date Value Ref Range Status   07/25/2022 27 22 - 29 mmol/L Final   07/24/2022 27 22 - 29 mmol/L Final   07/23/2022 25 22 - 29 mmol/L Final     BUN   Date Value Ref Range Status   07/25/2022 51 (H) 6 - 23 mg/dL Final   07/24/2022 55 (H) 6 - 23 mg/dL Final   07/23/2022 53 (H) 6 - 23 mg/dL Final     Creatinine   Date Value Ref Range Status   07/25/2022 1.5 (H) 0.7 - 1.2 mg/dL Final   07/24/2022 1.9 (H) 0.7 - 1.2 mg/dL Final   07/23/2022 2.0 (H) 0.7 - 1.2 mg/dL Final     Glucose   Date Value Ref Range Status   07/25/2022 98 74 - 99 mg/dL Final   07/24/2022 103 (H) 74 - 99 mg/dL Final   07/23/2022 103 (H) 74 - 99 mg/dL Final     Calcium   Date Value Ref Range Status   07/25/2022 7.5 (L) 8.6 - 10.2 mg/dL Final   07/24/2022 7.6 (L) 8.6 - 10.2 mg/dL Final 07/23/2022 7.4 (L) 8.6 - 10.2 mg/dL Final     Total Protein   Date Value Ref Range Status   07/25/2022 5.4 (L) 6.4 - 8.3 g/dL Final   07/24/2022 5.3 (L) 6.4 - 8.3 g/dL Final   07/23/2022 5.2 (L) 6.4 - 8.3 g/dL Final     Albumin   Date Value Ref Range Status   07/25/2022 2.5 (L) 3.5 - 5.2 g/dL Final   07/24/2022 2.7 (L) 3.5 - 5.2 g/dL Final   07/23/2022 2.5 (L) 3.5 - 5.2 g/dL Final     Total Bilirubin   Date Value Ref Range Status   07/25/2022 0.9 0.0 - 1.2 mg/dL Final   07/24/2022 0.7 0.0 - 1.2 mg/dL Final   07/23/2022 0.7 0.0 - 1.2 mg/dL Final     Alkaline Phosphatase   Date Value Ref Range Status   07/25/2022 83 40 - 129 U/L Final   07/24/2022 76 40 - 129 U/L Final   07/23/2022 79 40 - 129 U/L Final     AST   Date Value Ref Range Status   07/25/2022 40 (H) 0 - 39 U/L Final   07/24/2022 51 (H) 0 - 39 U/L Final   07/23/2022 72 (H) 0 - 39 U/L Final     ALT   Date Value Ref Range Status   07/25/2022 26 0 - 40 U/L Final   07/24/2022 30 0 - 40 U/L Final   07/23/2022 30 0 - 40 U/L Final     GFR Non-   Date Value Ref Range Status   07/25/2022 44 >=60 mL/min/1.73 Final     Comment:     Chronic Kidney Disease: less than 60 ml/min/1.73 sq.m. Kidney Failure: less than 15 ml/min/1.73 sq.m. Results valid for patients 18 years and older. 07/24/2022 34 >=60 mL/min/1.73 Final     Comment:     Chronic Kidney Disease: less than 60 ml/min/1.73 sq.m. Kidney Failure: less than 15 ml/min/1.73 sq.m. Results valid for patients 18 years and older. 07/23/2022 32 >=60 mL/min/1.73 Final     Comment:     Chronic Kidney Disease: less than 60 ml/min/1.73 sq.m. Kidney Failure: less than 15 ml/min/1.73 sq.m. Results valid for patients 18 years and older.        GFR    Date Value Ref Range Status   07/25/2022 53  Final   07/24/2022 41  Final   07/23/2022 38  Final     Magnesium   Date Value Ref Range Status   07/25/2022 2.1 1.6 - 2.6 mg/dL Final   07/24/2022 2.2 1.6 - 2.6 mg/dL Final   07/23/2022 2.2 1.6 - 2.6 mg/dL Final     Phosphorus   Date Value Ref Range Status   07/25/2022 2.7 2.5 - 4.5 mg/dL Final   07/24/2022 3.4 2.5 - 4.5 mg/dL Final   07/23/2022 3.5 2.5 - 4.5 mg/dL Final     No results for input(s): PH, PO2, PCO2, HCO3, BE, O2SAT in the last 72 hours. RADIOLOGY:  XR CHEST PORTABLE   Final Result   No significant changes since the previous study of July 24. XR CHEST PORTABLE   Final Result   No significant changes since the July 23. XR CHEST PORTABLE   Final Result   Persistent findings of a decompensated congestive heart failure. No   significant changes since July 21st.         CT Head WO Contrast   Final Result   1. No acute intracranial abnormality. 2. Chronic small vessel ischemic disease and generalized cerebral volume loss. 3. Rather diffuse scalp swelling, right greater than left. XR CHEST PORTABLE   Final Result   Cardiomegaly with pulmonary edema and bilateral small pleural effusions   suggestive of congestive heart failure acute exacerbation. Superimposed   pneumonia, particularly in the left lung base is not excluded. IR GUIDED THORACENTESIS PLEURAL    (Results Pending)           PROBLEM LIST:  Principal Problem:    Diastolic CHF, acute ([address]  Active Problems:    Acute pulmonary edema (HCC)  Resolved Problems:    * No resolved hospital problems. *      IMPRESSION:  HFrEF  HFpEF  Right heart failure/cor pulmonale  Atrial fibrillation new to the patient  Bilateral pleural effusions left greater than right  Possible previous NSTEMI  Stasis dermatitis  CA superimposed on CKD, improved      Plan:  1. Begin small dose of Lasix daily 20 mg  2. Hold Eliquis in preparation for left thoracentesis  3. Continue antibiotics for now  4. Still awaiting urine culture results  5. Increase hydralazine to 25 mg p.o. every 8 hours with a hold for systolic blood pressure less than 100  6. Avoid nephrotoxic drugs  7.   Follow renal function/indices carefully  8.   Thoracentesis in a.m. on the left side    Critical care time:  36 minutes    Electronically signed by Sita Mirza MD on 7/25/2022 at 10:31 AM

## 2022-07-25 NOTE — CARE COORDINATION
7/25/2022 CM transition of care: ICU, planning left thora, 1lnc, lasix, zosyn, dnr-cca. Spoke to patient and son Zheng Cardona and they would like either SNF discharge to [facility] or [address] [place] [address]- closer to the son. Pt can not go home and pt is in agreement with SNF. PRECERT needed, VALERIA signed and HENS at discharge. Referral to both [address] [facility] left - would like a fax number so CM can send information to review and information faxed to [facility] with [address] at [phone], [phone]. Ambulette form in soft blue chart. Electronically signed by CUONG Strauss on 7/25/2022 at 12:01 PM       ADDENDUM; [facility]- does not accept pts BC/BS coverage. [facility]- their sister facility does.  Will follow up with [facility] tomorrow- then speak to pts son- about this alternate facility  Electronically signed by CUONG Strauss on 7/25/2022 at 3:16 PM

## 2022-07-25 NOTE — PROGRESS NOTES
Comprehensive Nutrition Assessment    Type and Reason for Visit:  Initial, RD Nutrition Re-Screen/LOS    Nutrition Recommendations/Plan:   Continue current diet & order ONS   Continue  to monitor     Malnutrition Assessment:  Malnutrition Status: At risk for malnutrition (Comment) (07/25/22 1440)    Context:  Chronic Illness     Findings of the 6 clinical characteristics of malnutrition:  Energy Intake:  Mild decrease in energy intake (Comment)  Weight Loss:  Unable to assess (no wt hx in EMR)     Body Fat Loss:  No significant body fat loss     Muscle Mass Loss:  No significant muscle mass loss    Fluid Accumulation:  No significant fluid accumulation     Strength:  Not Performed    Nutrition Assessment:    Pt admits w/ being unresponsive dx Acute hypoxemic respiratory failure/pulmonary edema/CHF/B/L pleural effusions/UTI/ CA on CKD. Hx CAD, CHF. Noted per SLP modied diet recs. PO intakes mostly > 50%. Will order ONS &  monitor    Nutrition Related Findings:    A/O x4, rounded/soft +BS, I/O _2.8L, +2 BLE edema, Elevated renal labs Wound Type: Skin Tears, Wound Consult Pending (upper back)       Current Nutrition Intake & Therapies:    Average Meal Intake: 51-75%, %, 26-50% (~> 75%)  Average Supplements Intake: None Ordered  ADULT DIET; Regular; 4 carb choices (60 gm/meal); Low Fat/Low Chol/High Fiber/2 gm Na; 1800 ml    Anthropometric Measures:  Height: 6' (182.9 cm)  Ideal Body Weight (IBW): 178 lbs (81 kg)    Admission Body Weight: 254 lb (115.2 kg) (7/22 be 1 st accurate)  Current Body Weight: 260 lb (117.9 kg) (7/25 bed), 146.1 % IBW.     Current BMI (kg/m2): 35.3  Usual Body Weight:  (no wt hx in EMR)          BMI Categories: Obese Class 2 (BMI 35.0 -39.9)    Estimated Daily Nutrient Needs:  Energy Requirements Based On: Kcal/kg  Weight Used for Energy Requirements: Admission  Energy (kcal/day): 2016-9535  Weight Used for Protein Requirements: Ideal  Protein (g/day): 100-110 (1.2-1.4 gm/kg)  Method Used for Fluid Requirements: 1 ml/kcal  Fluid (ml/day): 1800 (per Dr order)    Nutrition Diagnosis:   Inadequate oral intake related to catabolic illness (CHF) as evidenced by intake 26-50%, intake 51-75%, wounds    Nutrition Interventions:   Food and/or Nutrient Delivery: Continue Current Diet  Nutrition Education/Counseling: No recommendation at this time  Coordination of Nutrition Care: Continue to monitor while inpatient     Goals:     Goals: PO intake 75% or greater     Nutrition Monitoring and Evaluation:   Behavioral-Environmental Outcomes: None Identified  Food/Nutrient Intake Outcomes: Food and Nutrient Intake  Physical Signs/Symptoms Outcomes: Biochemical Data, GI Status, Fluid Status or Edema, Weight, Skin, Nutrition Focused Physical Findings    Discharge Planning:     Too soon to determine     Thermon MARTA Wheat  Contact: [phone]

## 2022-07-25 NOTE — CARE COORDINATION
7/25/2022 CM transition of care: ICU, planning left thora, 1lnc, lasix, zosyn, dnr-cca. Spoke to patient and son Sybil Goodell and they would like either SNF discharge to [facility] or [address] [place] [address]- closer to the son. Pt can not go home and pt is in agreement with SNF, wherever his son would like. Bladimir Rigo PRECERT needed, VALERIA signed and HENS at discharge. Referral to [address] [place] [address]- would like a fax so CM can send information to review. Ambulette form in soft blue chart. Electronically signed by CUONG Vann on 7/25/2022 at 12:01 PM      The Plan for Transition of Care is related to the following treatment goals: SNF    The Patient and/or patient representative Killian Wills was provided with a choice of provider and agrees   with the discharge plan. [x] Yes [] No    Freedom of choice list was provided with basic dialogue that supports the patient's individualized plan of care/goals, treatment preferences and shares the quality data associated with the providers. [x] Yes [] No    Had their own locations they are interested in, in the [facility].        Electronically signed by CUONG Vann on 7/25/2022 at 12:05 PM

## 2022-07-25 NOTE — PROGRESS NOTES
SPEECH/LANGUAGE PATHOLOGY  CLINICAL ASSESSMENT OF SWALLOWING FUNCTION   and PLAN OF CARE    PATIENT NAME:  Sandy Burrell  (male)     MRN:  [id number]    :  1933  (80 y.o.)  STATUS:  Inpatient: Room 09/IC09-01    TODAY'S DATE:  2022  REFERRING PROVIDER:     SLP swallowing-dysphagia evaluation and treatment  Start:  22,   End:  22,   ONE TIME,   Standing Count:  1 Occurrences,   R         Marley Fernandez MD   REASON FOR REFERRAL: dysphagia   EVALUATING THERAPIST: MARIANGEL Isabel                 RESULTS:    DYSPHAGIA DIAGNOSIS:   Clinical indicators of mild  pharyngeal phase dysphagia       DIET RECOMMENDATIONS:  Regular consistency solids (IDDSI level 7) with  nectar consistency (mildly thick - IDDSI level 2) liquids     FEEDING RECOMMENDATIONS:     Assistance level:  Set-up is required for all oral intake      Compensatory strategies recommended: Throat clear      Discussed recommendations with nursing and/or faxed report to referring provider: Yes    SPEECH THERAPY  PLAN OF CARE   The dysphagia POC is established based on physician order, dysphagia diagnosis and results of clinical assessment     Meal time assessment for 1-2 sessions to provide diet modification and compensatory strategy implementation due to staff report of dysphagia symptoms during meals    Conditions Requiring Skilled Therapeutic Intervention for dysphagia:    Wet vocal quality during PO intake  Coughing during PO intake      Specific dysphagia interventions to include:     Training in positioning for improved integrity of swallow  Compensatory strategy training   Therapeutic exercises    Specific instructions for next treatment:  development and training of compensatory swallow strategies to improve airway protection and swallow function  Patient Treatment Goals:    Short Term Goals:  Pt will implement identified compensatory swallowing strategies on 90% of opportunities or greater to improve airway achieve adequate cohesive bolus prep as evidenced by satisfactory mastication patterns, timely A-P bolus propulsion, and minimal oral residuals. Dentition:  dentures      Pharyngeal Stage:    Latent wet cough was noted after presentation of thin liquid  No other signs of aspiration were noted during this evaluation however, silent aspiration cannot be ruled out at bedside. If silent aspiration is suspected, a Videofluoroscopic Study of Swallowing (MBS) is recommended and requires a physician order. Cognition:   Alert & Oriented x 3 and Follows 2 - step directions appropriate for this assessment    Oral Peripheral Examination   Adequate lingual/labial strength     Current Respiratory Status    1 liters nasal cannula     Parameters of Speech Production  Respiration:  Adequate for speech production  Quality:   Within functional limits--WET VOCAL QUALITY AT TIMES DURING SESSION  Intensity: Within functional limits    Volitional Swallow: present     Volitional Cough:   present     Pain: No pain reported. EDUCATION:   The Speech Language Pathologist (SLP) completed education regarding results of evaluation and that intervention is warranted at this time. Learner: Patient  Education: Reviewed results and recommendations of this evaluation  Evaluation of Education:  Needs further instruction    This plan may be re-evaluated and revised as warranted. Evaluation Time includes thorough review of current medical information, gathering information on past medical history/social history and prior level of function, completion of standardized testing/informal observation of tasks, assessment of data and education on plan of care and goals. [x]The admitting diagnosis and active problem list, have been reviewed prior to initiation of this evaluation.         ACTIVE PROBLEM LIST:   Patient Active Problem List   Diagnosis    Diastolic CHF, acute ([facility] [address]    Acute pulmonary edema ([facility] [address] [facility]         CPT code:  39873  bedside neal Whitaker MSCCC/SLP  Speech Language Pathologist  [id number]

## 2022-07-25 NOTE — PLAN OF CARE
Problem: Discharge Planning  Goal: Discharge to home or other facility with appropriate resources  7/25/2022 0150 by Mariah Huizar RN  Outcome: Progressing     Problem: Skin/Tissue Integrity  Goal: Absence of new skin breakdown  Description: 1. Monitor for areas of redness and/or skin breakdown  2. Assess vascular access sites hourly  3. Every 4-6 hours minimum:  Change oxygen saturation probe site  4. Every 4-6 hours:  If on nasal continuous positive airway pressure, respiratory therapy assess nares and determine need for appliance change or resting period.   7/25/2022 0150 by Mariah Huizar RN  Outcome: Progressing     Problem: ABCDS Injury Assessment  Goal: Absence of physical injury  7/25/2022 0150 by Mariah Huizar RN  Outcome: Progressing     Problem: Safety - Adult  Goal: Free from fall injury  7/25/2022 0150 by Mariah Huizar RN  Outcome: Progressing     Problem: Pain  Goal: Verbalizes/displays adequate comfort level or baseline comfort level  7/25/2022 0150 by Mariah Huizar RN  Outcome: Progressing

## 2022-07-25 NOTE — PROGRESS NOTES
Admitting Date and Time: 7/21/2022  1:41 PM  Admit Dx: Acute pulmonary edema (HCC) [Y08.3]  Diastolic CHF, acute (HCC) [I50.31]  Acute respiratory failure with hypoxia (HCC) [J96.01]  Atrial fibrillation, unspecified type ([facility] [I48.91]    Subjective: Only able to respond simply. Upon repeat questioning he remains not able to fully converse deeply  Per RN: no new issues    ROS: denies fever, chills, cp, sob, n/v, HA unless stated above.      furosemide  20 mg IntraVENous Daily    hydrALAZINE  25 mg Oral 3 times per day    carvedilol  3.125 mg Oral BID WC    pantoprazole  40 mg Oral QAM AC    piperacillin-tazobactam  3,375 mg IntraVENous Q8H    [Held by provider] apixaban  2.5 mg Oral BID    sodium chloride flush  5-40 mL IntraVENous 2 times per day    heparin flush  1 mL IntraVENous 2 times per day    amiodarone  200 mg Oral Daily    sodium chloride flush  5-40 mL IntraVENous 2 times per day     sodium chloride flush, 5-40 mL, PRN  sodium chloride, , PRN  heparin flush, 1 mL, PRN  sodium chloride flush, 5-40 mL, PRN  sodium chloride, , PRN  ondansetron, 4 mg, Q8H PRN   Or  ondansetron, 4 mg, Q6H PRN  polyethylene glycol, 17 g, Daily PRN  acetaminophen, 650 mg, Q6H PRN   Or  acetaminophen, 650 mg, Q6H PRN  perflutren lipid microspheres, 1.5 mL, ONCE PRN       Objective:    /71   Pulse 82   Temp 98.6 °F (37 °C) (Oral)   Resp 22   Ht 6' (1.829 m) Comment: Estimated  Wt 259 lb 9.6 oz (117.8 kg)   SpO2 94%   BMI 35.21 kg/m²   General Appearance: alert and oriented to person, place and time and in no acute distress  Skin: warm and dry  Head: normocephalic and atraumatic  Eyes: pupils equal, round, and reactive to light, extraocular eye movements intact, conjunctivae normal  Neck: neck supple and non tender without mass   Pulmonary/Chest: clear to auscultation bilaterally- no wheezes, rales or rhonchi, normal air movement, no respiratory distress  Cardiovascular: normal rate, normal S1 and S2 and no carotid bruits  Abdomen: soft, non-tender, non-distended, normal bowel sounds, no masses or organomegaly  Extremities: no cyanosis, no clubbing   Still significant anasarca  Neurologic: no cranial nerve deficit and speech normal  Not able to process and remember conversation      Recent Labs     07/23/22 0520 07/24/22 0514 07/25/22  0528    138 141   K 4.0 4.3 3.9   * 105 106   CO2 25 27 27   BUN 53* 55* 51*   CREATININE 2.0* 1.9* 1.5*   GLUCOSE 103* 103* 98   CALCIUM 7.4* 7.6* 7.5*         Recent Labs     07/23/22 0520 07/24/22 0514 07/25/22  0528   ALKPHOS 79 76 83   PROT 5.2* 5.3* 5.4*   LABALBU 2.5* 2.7* 2.5*   BILITOT 0.7 0.7 0.9   AST 72* 51* 40*   ALT 30 30 26         Recent Labs     07/23/22 0520 07/24/22 0514 07/25/22  0528   WBC 9.0 8.5 7.1   RBC 4.24 4.16 4.16   HGB 12.3* 12.5 12.3*   HCT 40.2 39.9 39.6   MCV 94.8 95.9 95.2   MCH 29.0 30.0 29.6   MCHC 30.6* 31.3* 31.1*   RDW 16.5* 16.2* 16.2*    195 212   MPV 10.1 9.2 9.6             Radiology:   XR CHEST PORTABLE   Final Result   No significant changes since the previous study of July 24. XR CHEST PORTABLE   Final Result   No significant changes since the July 23. XR CHEST PORTABLE   Final Result   Persistent findings of a decompensated congestive heart failure. No   significant changes since July 21st.         CT Head WO Contrast   Final Result   1. No acute intracranial abnormality. 2. Chronic small vessel ischemic disease and generalized cerebral volume loss. 3. Rather diffuse scalp swelling, right greater than left. XR CHEST PORTABLE   Final Result   Cardiomegaly with pulmonary edema and bilateral small pleural effusions   suggestive of congestive heart failure acute exacerbation. Superimposed   pneumonia, particularly in the left lung base is not excluded.          IR GUIDED THORACENTESIS PLEURAL    (Results Pending)   XR CHEST PORTABLE    (Results Pending)       Assessment:  Principal Problem: Diastolic CHF, acute ([facility] [address]  Active Problems:    Acute pulmonary edema (HCC)  Resolved Problems:    * No resolved hospital problems. *      Plan: History of present illness from History and Physical:   80 y.o. male with a history of CAD s/p CABG presents with unresponsiveness. Initially in the ER no history was able to be obtained. Later son came in and helped with the following. Patient is socially active. He was last seen by the son last week however was seen by his friends 1 or 2 days ago. He was found on the floor at his home. He was hypoxic. When he presented into the emergency room the emergency room doctor said that he was satting in the 70%. BiPAP was placed approximately 1 to 2 hours prior to my assessment of the patient. He had not been responsive or coherent prior to that. The son discussed CODE STATUS with the ED doctor so the patient is wishing to be a DNR DNI  We are still not able to obtain details as mentioned above and below. During my assessment the son and other family members apparently have left the department    During his ER course he was also given a Oneil, nitro drip, and Lasix 80 mg IV x1.       2D echo 7/22/2022:  Technically sub-optimal images. Normal left ventricular chamber size. Mild global hypokinesis, abnormal septal motion, LV EF 40 +/- 3%. Indeterminate LV diastolic function. Left atrium is moderately enlarged. Severely increased left atrial volume. Interatrial septum not well visualized but appears intact. Right ventricle enlarged with decreased function. Moderately enlarged right atrium. Moderate eccentric mitral regurgitation present. No mitral valve prolapse. No hemodynamically significant aortic stenosis. There is mild aortic regurgitation. There is mild to moderate tricuspid regurgitation. Moderate Pulmonary hypertension, RVSP 62mmHg. Normal aortic root size. No evidence of pericardial effusion. Pleural effusion noted.    The inferior vena cava dilated with decreased respiratory variation. No intra cardiac mass or thrombus. No comparison study available. Acute decompensated systolic heart failure causing acute respiratory failure. Clinically responding well to BiPAP. Hold further  Lasix now. See #3. Posterior located bilateral pleural effusion per CxR. Pulmonary plan for thoracentesis on 7/26  A. fib RVR rate. Antiarrhythmics (Coreg, hydralazine) adjusted per cardiologist who signed off on 7/24    High trop: Likely demand ischemia from hypoxic respiratory failure and Rhabdo. Deferring further w/u    Antonio:  Crt steady decrease from 2.0--> 1.9--> 1.5. Dementia presumptively. Spoke to son on 7/25 Jeffry Potts who says that patient has been cognitively impaired for at least a couple of years and has refused to see a doctor. He is not able to retain and learn new information  Life threatening noncompliance. This might relate to possible toxic encephalopathy due to #1 with previously undiagnosed dementia. He does not have capacity to make his own decisions at this time  DVT prophylaxis: Eliquis so cardio holding asa  DNR CCA. Disposition likely needs SNF    NOTE: This report was transcribed using voice recognition software. Every effort was made to ensure accuracy; however, inadvertent computerized transcription errors may be present.      Electronically signed by Chelsey Holguin MD on 7/25/2022 at 2:09 PM

## 2022-07-26 ENCOUNTER — APPOINTMENT (OUTPATIENT)
Dept: GENERAL RADIOLOGY | Age: 87
DRG: 291 | End: 2022-07-26
Payer: MEDICARE

## 2022-07-26 ENCOUNTER — APPOINTMENT (OUTPATIENT)
Dept: INTERVENTIONAL RADIOLOGY/VASCULAR | Age: 87
DRG: 291 | End: 2022-07-26
Payer: MEDICARE

## 2022-07-26 LAB
ALBUMIN SERPL-MCNC: 2.6 G/DL (ref 3.5–5.2)
ALP BLD-CCNC: 97 U/L (ref 40–129)
ALT SERPL-CCNC: 25 U/L (ref 0–40)
ANION GAP SERPL CALCULATED.3IONS-SCNC: 8 MMOL/L (ref 7–16)
APPEARANCE FLUID: CLEAR
AST SERPL-CCNC: 37 U/L (ref 0–39)
BASOPHILS ABSOLUTE: 0.03 E9/L (ref 0–0.2)
BASOPHILS RELATIVE PERCENT: 0.4 % (ref 0–2)
BILIRUB SERPL-MCNC: 0.9 MG/DL (ref 0–1.2)
BUN BLDV-MCNC: 42 MG/DL (ref 6–23)
CALCIUM SERPL-MCNC: 7.4 MG/DL (ref 8.6–10.2)
CELL COUNT FLUID TYPE: NORMAL
CHLORIDE BLD-SCNC: 104 MMOL/L (ref 98–107)
CO2: 28 MMOL/L (ref 22–29)
COLOR FLUID: YELLOW
CREAT SERPL-MCNC: 1.5 MG/DL (ref 0.7–1.2)
CRITICAL: NORMAL
DATE ANALYZED: NORMAL
DATE OF COLLECTION: NORMAL
EOSINOPHILS ABSOLUTE: 0.38 E9/L (ref 0.05–0.5)
EOSINOPHILS RELATIVE PERCENT: 5.2 % (ref 0–6)
FLUID TYPE: NORMAL
GFR AFRICAN AMERICAN: 53
GFR NON-AFRICAN AMERICAN: 44 ML/MIN/1.73
GLUCOSE BLD-MCNC: 99 MG/DL (ref 74–99)
GLUCOSE, FLUID: 137 MG/DL
HCT VFR BLD CALC: 38 % (ref 37–54)
HEMOGLOBIN: 11.8 G/DL (ref 12.5–16.5)
IMMATURE GRANULOCYTES #: 0.03 E9/L
IMMATURE GRANULOCYTES %: 0.4 % (ref 0–5)
INR BLD: 1.2
LAB: NORMAL
LD, FLUID: 103 U/L
LYMPHOCYTES ABSOLUTE: 1.37 E9/L (ref 1.5–4)
LYMPHOCYTES RELATIVE PERCENT: 18.6 % (ref 20–42)
Lab: NORMAL
MCH RBC QN AUTO: 29.5 PG (ref 26–35)
MCHC RBC AUTO-ENTMCNC: 31.1 % (ref 32–34.5)
MCV RBC AUTO: 95 FL (ref 80–99.9)
MONOCYTE, FLUID: 93 %
MONOCYTES ABSOLUTE: 0.92 E9/L (ref 0.1–0.95)
MONOCYTES RELATIVE PERCENT: 12.5 % (ref 2–12)
NEUTROPHIL, FLUID: 7 %
NEUTROPHILS ABSOLUTE: 4.62 E9/L (ref 1.8–7.3)
NEUTROPHILS RELATIVE PERCENT: 62.9 % (ref 43–80)
NUCLEATED CELLS FLUID: 494 /UL
OPERATOR ID: 797
PDW BLD-RTO: 16.1 FL (ref 11.5–15)
PH FLUID: 7.39
PLATELET # BLD: 213 E9/L (ref 130–450)
PMV BLD AUTO: 9.9 FL (ref 7–12)
POTASSIUM SERPL-SCNC: 4 MMOL/L (ref 3.5–5)
PROTEIN FLUID: 1.1 G/DL
PROTHROMBIN TIME: 14 SEC (ref 9.3–12.4)
RBC # BLD: 4 E12/L (ref 3.8–5.8)
RBC FLUID: <2000 /UL
SODIUM BLD-SCNC: 140 MMOL/L (ref 132–146)
SOURCE, BLOOD GAS: NORMAL
TIME ANALYZED: 1044
TOTAL PROTEIN: 5.2 G/DL (ref 6.4–8.3)
WBC # BLD: 7.4 E9/L (ref 4.5–11.5)

## 2022-07-26 PROCEDURE — 83615 LACTATE (LD) (LDH) ENZYME: CPT

## 2022-07-26 PROCEDURE — 6360000002 HC RX W HCPCS: Performed by: NURSE PRACTITIONER

## 2022-07-26 PROCEDURE — 2580000003 HC RX 258: Performed by: INTERNAL MEDICINE

## 2022-07-26 PROCEDURE — 0W9B3ZZ DRAINAGE OF LEFT PLEURAL CAVITY, PERCUTANEOUS APPROACH: ICD-10-PCS | Performed by: STUDENT IN AN ORGANIZED HEALTH CARE EDUCATION/TRAINING PROGRAM

## 2022-07-26 PROCEDURE — 97110 THERAPEUTIC EXERCISES: CPT

## 2022-07-26 PROCEDURE — 80053 COMPREHEN METABOLIC PANEL: CPT

## 2022-07-26 PROCEDURE — 88305 TISSUE EXAM BY PATHOLOGIST: CPT

## 2022-07-26 PROCEDURE — 49083 ABD PARACENTESIS W/IMAGING: CPT

## 2022-07-26 PROCEDURE — 6360000002 HC RX W HCPCS: Performed by: INTERNAL MEDICINE

## 2022-07-26 PROCEDURE — 84157 ASSAY OF PROTEIN OTHER: CPT

## 2022-07-26 PROCEDURE — 36592 COLLECT BLOOD FROM PICC: CPT

## 2022-07-26 PROCEDURE — 89051 BODY FLUID CELL COUNT: CPT

## 2022-07-26 PROCEDURE — 97165 OT EVAL LOW COMPLEX 30 MIN: CPT

## 2022-07-26 PROCEDURE — 71045 X-RAY EXAM CHEST 1 VIEW: CPT

## 2022-07-26 PROCEDURE — 83986 ASSAY PH BODY FLUID NOS: CPT

## 2022-07-26 PROCEDURE — 99232 SBSQ HOSP IP/OBS MODERATE 35: CPT | Performed by: INTERNAL MEDICINE

## 2022-07-26 PROCEDURE — 85610 PROTHROMBIN TIME: CPT

## 2022-07-26 PROCEDURE — 87070 CULTURE OTHR SPECIMN AEROBIC: CPT

## 2022-07-26 PROCEDURE — 88112 CYTOPATH CELL ENHANCE TECH: CPT

## 2022-07-26 PROCEDURE — 6370000000 HC RX 637 (ALT 250 FOR IP): Performed by: INTERNAL MEDICINE

## 2022-07-26 PROCEDURE — 82947 ASSAY GLUCOSE BLOOD QUANT: CPT

## 2022-07-26 PROCEDURE — 85025 COMPLETE CBC W/AUTO DIFF WBC: CPT

## 2022-07-26 PROCEDURE — 2580000003 HC RX 258: Performed by: NURSE PRACTITIONER

## 2022-07-26 PROCEDURE — 87205 SMEAR GRAM STAIN: CPT

## 2022-07-26 PROCEDURE — 92526 ORAL FUNCTION THERAPY: CPT | Performed by: SPEECH-LANGUAGE PATHOLOGIST

## 2022-07-26 PROCEDURE — 2700000000 HC OXYGEN THERAPY PER DAY

## 2022-07-26 PROCEDURE — 1200000000 HC SEMI PRIVATE

## 2022-07-26 RX ORDER — AMOXICILLIN AND CLAVULANATE POTASSIUM 500; 125 MG/1; MG/1
1 TABLET, FILM COATED ORAL EVERY 12 HOURS SCHEDULED
Status: DISCONTINUED | OUTPATIENT
Start: 2022-07-27 | End: 2022-07-28 | Stop reason: HOSPADM

## 2022-07-26 RX ORDER — LACTOBACILLUS RHAMNOSUS GG 10B CELL
1 CAPSULE ORAL DAILY
Status: DISCONTINUED | OUTPATIENT
Start: 2022-07-26 | End: 2022-07-28 | Stop reason: HOSPADM

## 2022-07-26 RX ADMIN — HYDRALAZINE HYDROCHLORIDE 25 MG: 25 TABLET, FILM COATED ORAL at 21:41

## 2022-07-26 RX ADMIN — PANTOPRAZOLE SODIUM 40 MG: 40 TABLET, DELAYED RELEASE ORAL at 05:29

## 2022-07-26 RX ADMIN — FUROSEMIDE 20 MG: 10 INJECTION, SOLUTION INTRAMUSCULAR; INTRAVENOUS at 08:00

## 2022-07-26 RX ADMIN — Medication 10 ML: at 09:00

## 2022-07-26 RX ADMIN — Medication 10 ML: at 07:59

## 2022-07-26 RX ADMIN — PIPERACILLIN AND TAZOBACTAM 3375 MG: 3; .375 INJECTION, POWDER, LYOPHILIZED, FOR SOLUTION INTRAVENOUS at 08:03

## 2022-07-26 RX ADMIN — Medication 10 ML: at 21:00

## 2022-07-26 RX ADMIN — PIPERACILLIN AND TAZOBACTAM 3375 MG: 3; .375 INJECTION, POWDER, LYOPHILIZED, FOR SOLUTION INTRAVENOUS at 01:03

## 2022-07-26 RX ADMIN — Medication 1 CAPSULE: at 13:16

## 2022-07-26 RX ADMIN — AMIODARONE HYDROCHLORIDE 200 MG: 200 TABLET ORAL at 08:00

## 2022-07-26 RX ADMIN — HEPARIN 100 UNITS: 100 SYRINGE at 07:59

## 2022-07-26 RX ADMIN — CARVEDILOL 3.12 MG: 3.12 TABLET, FILM COATED ORAL at 08:00

## 2022-07-26 RX ADMIN — PIPERACILLIN AND TAZOBACTAM 3375 MG: 3; .375 INJECTION, POWDER, FOR SOLUTION INTRAVENOUS at 17:14

## 2022-07-26 RX ADMIN — HEPARIN 100 UNITS: 100 SYRINGE at 21:00

## 2022-07-26 RX ADMIN — HYDRALAZINE HYDROCHLORIDE 25 MG: 25 TABLET, FILM COATED ORAL at 13:17

## 2022-07-26 RX ADMIN — HYDRALAZINE HYDROCHLORIDE 25 MG: 25 TABLET, FILM COATED ORAL at 05:29

## 2022-07-26 RX ADMIN — CARVEDILOL 3.12 MG: 3.12 TABLET, FILM COATED ORAL at 17:08

## 2022-07-26 ASSESSMENT — PAIN SCALES - GENERAL
PAINLEVEL_OUTOF10: 0

## 2022-07-26 NOTE — PROGRESS NOTES
Patient came down to Special Procedures for ultrasound guided left thoracentesis. Procedure was explained, questions were answered. 1027  Starting procedure /58 79 18 97% on 1 liter nasal canula     1038  Ending procedure /58 74 16 95% on 1 liter nasal canula     320 cc of straw color pleural fluid drained from patient, petrolatum dressing 2 x 2 and Tegaderm applied to left back. Patient tolerated procedure    Post procedure chest xray taken    Respiratory came took specimen for Kai 30    Specimen sent to lab. Nurse to nurse given to [address] [place] [address],  nurse notified of above information    Patient transported back to floor.

## 2022-07-26 NOTE — FLOWSHEET NOTE
Report called to [facility] on the 4th floor at 14:45. Patient to be transferred to new room via transportation. Mariel Ghosh

## 2022-07-26 NOTE — CARE COORDINATION
7/26/2022 Cm transition of care: ICU, left thoracentesis removed 320 cc, 1lnc, lasix, zosyn, dnr-cca. Referral to [facility]- additional information faxed as requested. PRECERT needed, VALERIA signed and HENS at discharge. [facility] can not accept pts coverage, will speak to son about [facility]- a sister facility of [address] [place] [address] unable to accept. Ambulette form in soft blue chart. CM following.  Electronically signed by Anthony Aponte RN-BC on 7/26/2022 at 1:51 PM

## 2022-07-26 NOTE — PROGRESS NOTES
Physical Therapy    IC09/IC09-01    Patient unavailable for physical therapy treatment due to out of room for a thoracentesis. Hamzah Osborne.  Ronda  [facility]  LIC # [id number]

## 2022-07-26 NOTE — PROGRESS NOTES
Speech Language Pathology      NAME:  Babita Pineda  :  1933  DATE: 2022  ROOM:  [place] [address]    Patient seen for dysphagia therapy 15 minutes with pm meal. Patient feeding self. Good rate of intake no clinical indicators of aspiration noted. Will sign off.   If silent aspiration is suspected please order MBSS    Acute pulmonary edema ([address] [B78.7]  Diastolic CHF, acute ([address] [I50.31]  Acute respiratory failure with hypoxia ([address] [J96.01]  Atrial fibrillation, unspecified type [facility]) [I48.91]    [address]  dysphagia tx    Thom Davis MSCCC/SLP  Speech Language Pathologist  [id number]

## 2022-07-26 NOTE — PROGRESS NOTES
Pulmonary/Critical Care Progress Note    We are following patient for HFrEF, HFR EF, right ventricular dysfunction, cor pulmonale, bilateral pleural effusions left greater than right, possible NSTEMI, pulmonary hypertension, atrial fibrillation, CA, stasis dermatitis of lower extremities    SUBJECTIVE:  Patient had an uneventful night and did not have any difficulty with current rhythm or desaturation or shortness of breath. He will be going to interventional radiology for thoracentesis today assuming that there is enough fluid detectable by ultrasound. I suspect there is at least 500 cc as an estimate. All labs have been ordered on the fluid. Patient continues to diurese appropriately on small doses of intravenous furosemide and is tolerating antibiotics including piperacillin without difficulty. Patient is also tolerating afterload reduction with carvedilol and hydralazine. Patient's BUN/creatinine are continuing to improve.     MEDICATIONS:   furosemide  20 mg IntraVENous Daily    hydrALAZINE  25 mg Oral 3 times per day    carvedilol  3.125 mg Oral BID WC    pantoprazole  40 mg Oral QAM AC    piperacillin-tazobactam  3,375 mg IntraVENous Q8H    [Held by provider] apixaban  2.5 mg Oral BID    sodium chloride flush  5-40 mL IntraVENous 2 times per day    heparin flush  1 mL IntraVENous 2 times per day    amiodarone  200 mg Oral Daily    sodium chloride flush  5-40 mL IntraVENous 2 times per day      sodium chloride Stopped (07/23/22 2205)    sodium chloride       sodium chloride flush, sodium chloride, heparin flush, sodium chloride flush, sodium chloride, ondansetron **OR** ondansetron, polyethylene glycol, acetaminophen **OR** acetaminophen, perflutren lipid microspheres      REVIEW OF SYSTEMS:  Constitutional: Denies fever, weight loss, night sweats, and fatigue  Skin: Denies pigmentation, dark lesions, and rashes   HEENT: Denies hearing loss, tinnitus, ear drainage, epistaxis, sore throat, and hoarseness. Cardiovascular: Denies palpitations, chest pain, and chest pressure. Respiratory: Denies cough, dyspnea at rest, hemoptysis, apnea, and choking. Gastrointestinal: Denies nausea, vomiting, poor appetite, diarrhea, heartburn or reflux  Genitourinary: Denies dysuria, frequency, urgency or hematuria  Musculoskeletal: Denies myalgias, muscle weakness, and bone pain  Neurological: Denies dizziness, vertigo, headache, and focal weakness  Psychological: Denies anxiety and depression  Endocrine: Denies heat intolerance and cold intolerance  Hematopoietic/Lymphatic: Denies bleeding problems and blood transfusions    OBJECTIVE:  Vitals:    07/26/22 0800   BP: (!) 149/93   Pulse: 85   Resp: 24   Temp: 98.9 °F (37.2 °C)   SpO2: 94%     FiO2 : 33 %  O2 Flow Rate (L/min): 1 L/min  O2 Device: Nasal cannula    PHYSICAL EXAM:  Constitutional: No fever, chills, diaphoresis  Skin: No skin rash, no skin breakdown except for chronic venous stasis changes of lower extremities  HEENT: Unremarkable  Neck: No JVD, lymphadenopathy, thyromegaly  Cardiovascular: S1, S2 regular. No S3 or rubs present  Respiratory: Decreased breath sounds left lower lung field with some crackles consistent with pleural effusion  Gastrointestinal: Soft, obese, nontender  Genitourinary: No CVA tenderness  Extremities: No clubbing, cyanosis, 1+ edema of feet legs and ankles bilaterally  Neurological: Awake, alert, oriented x3. No evidence of focal motor or sensory deficits  Psychological: In good spirits.   Appropriate affect    LABS:  WBC   Date Value Ref Range Status   07/26/2022 7.4 4.5 - 11.5 E9/L Final   07/25/2022 7.1 4.5 - 11.5 E9/L Final   07/24/2022 8.5 4.5 - 11.5 E9/L Final     Hemoglobin   Date Value Ref Range Status   07/26/2022 11.8 (L) 12.5 - 16.5 g/dL Final   07/25/2022 12.3 (L) 12.5 - 16.5 g/dL Final   07/24/2022 12.5 12.5 - 16.5 g/dL Final     Hematocrit   Date Value Ref Range Status   07/26/2022 38.0 37.0 - 54.0 % Final   07/25/2022 39.6 37.0 - 54.0 % Final   07/24/2022 39.9 37.0 - 54.0 % Final     MCV   Date Value Ref Range Status   07/26/2022 95.0 80.0 - 99.9 fL Final   07/25/2022 95.2 80.0 - 99.9 fL Final   07/24/2022 95.9 80.0 - 99.9 fL Final     Platelets   Date Value Ref Range Status   07/26/2022 213 130 - 450 E9/L Final   07/25/2022 212 130 - 450 E9/L Final   07/24/2022 195 130 - 450 E9/L Final     Sodium   Date Value Ref Range Status   07/26/2022 140 132 - 146 mmol/L Final   07/25/2022 141 132 - 146 mmol/L Final   07/24/2022 138 132 - 146 mmol/L Final     Potassium   Date Value Ref Range Status   07/26/2022 4.0 3.5 - 5.0 mmol/L Final   07/25/2022 3.9 3.5 - 5.0 mmol/L Final   07/24/2022 4.3 3.5 - 5.0 mmol/L Final     Potassium reflex Magnesium   Date Value Ref Range Status   07/21/2022 5.0 3.5 - 5.0 mmol/L Final     Chloride   Date Value Ref Range Status   07/26/2022 104 98 - 107 mmol/L Final   07/25/2022 106 98 - 107 mmol/L Final   07/24/2022 105 98 - 107 mmol/L Final     CO2   Date Value Ref Range Status   07/26/2022 28 22 - 29 mmol/L Final   07/25/2022 27 22 - 29 mmol/L Final   07/24/2022 27 22 - 29 mmol/L Final     BUN   Date Value Ref Range Status   07/26/2022 42 (H) 6 - 23 mg/dL Final   07/25/2022 51 (H) 6 - 23 mg/dL Final   07/24/2022 55 (H) 6 - 23 mg/dL Final     Creatinine   Date Value Ref Range Status   07/26/2022 1.5 (H) 0.7 - 1.2 mg/dL Final   07/25/2022 1.5 (H) 0.7 - 1.2 mg/dL Final   07/24/2022 1.9 (H) 0.7 - 1.2 mg/dL Final     Glucose   Date Value Ref Range Status   07/26/2022 99 74 - 99 mg/dL Final   07/25/2022 98 74 - 99 mg/dL Final   07/24/2022 103 (H) 74 - 99 mg/dL Final     Calcium   Date Value Ref Range Status   07/26/2022 7.4 (L) 8.6 - 10.2 mg/dL Final   07/25/2022 7.5 (L) 8.6 - 10.2 mg/dL Final   07/24/2022 7.6 (L) 8.6 - 10.2 mg/dL Final     Total Protein   Date Value Ref Range Status   07/26/2022 5.2 (L) 6.4 - 8.3 g/dL Final   07/25/2022 5.4 (L) 6.4 - 8.3 g/dL Final   07/24/2022 5.3 (L) 6.4 - 8.3 g/dL Final Albumin   Date Value Ref Range Status   07/26/2022 2.6 (L) 3.5 - 5.2 g/dL Final   07/25/2022 2.5 (L) 3.5 - 5.2 g/dL Final   07/24/2022 2.7 (L) 3.5 - 5.2 g/dL Final     Total Bilirubin   Date Value Ref Range Status   07/26/2022 0.9 0.0 - 1.2 mg/dL Final   07/25/2022 0.9 0.0 - 1.2 mg/dL Final   07/24/2022 0.7 0.0 - 1.2 mg/dL Final     Alkaline Phosphatase   Date Value Ref Range Status   07/26/2022 97 40 - 129 U/L Final   07/25/2022 83 40 - 129 U/L Final   07/24/2022 76 40 - 129 U/L Final     AST   Date Value Ref Range Status   07/26/2022 37 0 - 39 U/L Final   07/25/2022 40 (H) 0 - 39 U/L Final   07/24/2022 51 (H) 0 - 39 U/L Final     ALT   Date Value Ref Range Status   07/26/2022 25 0 - 40 U/L Final   07/25/2022 26 0 - 40 U/L Final   07/24/2022 30 0 - 40 U/L Final     GFR Non-   Date Value Ref Range Status   07/26/2022 44 >=60 mL/min/1.73 Final     Comment:     Chronic Kidney Disease: less than 60 ml/min/1.73 sq.m. Kidney Failure: less than 15 ml/min/1.73 sq.m. Results valid for patients 18 years and older. 07/25/2022 44 >=60 mL/min/1.73 Final     Comment:     Chronic Kidney Disease: less than 60 ml/min/1.73 sq.m. Kidney Failure: less than 15 ml/min/1.73 sq.m. Results valid for patients 18 years and older. 07/24/2022 34 >=60 mL/min/1.73 Final     Comment:     Chronic Kidney Disease: less than 60 ml/min/1.73 sq.m. Kidney Failure: less than 15 ml/min/1.73 sq.m. Results valid for patients 18 years and older.        GFR    Date Value Ref Range Status   07/26/2022 53  Final   07/25/2022 53  Final   07/24/2022 41  Final     Magnesium   Date Value Ref Range Status   07/25/2022 2.1 1.6 - 2.6 mg/dL Final   07/24/2022 2.2 1.6 - 2.6 mg/dL Final   07/23/2022 2.2 1.6 - 2.6 mg/dL Final     Phosphorus   Date Value Ref Range Status   07/25/2022 2.7 2.5 - 4.5 mg/dL Final   07/24/2022 3.4 2.5 - 4.5 mg/dL Final   07/23/2022 3.5 2.5 - 4.5 mg/dL Final     No results for input(s): PH, PO2, PCO2, HCO3, BE, O2SAT in the last 72 hours. RADIOLOGY:  XR CHEST PORTABLE   Final Result   No significant interval change of the past 24 hours. XR CHEST PORTABLE   Final Result   No significant changes since the previous study of July 24. XR CHEST PORTABLE   Final Result   No significant changes since the July 23. XR CHEST PORTABLE   Final Result   Persistent findings of a decompensated congestive heart failure. No   significant changes since July 21st.         CT Head WO Contrast   Final Result   1. No acute intracranial abnormality. 2. Chronic small vessel ischemic disease and generalized cerebral volume loss. 3. Rather diffuse scalp swelling, right greater than left. XR CHEST PORTABLE   Final Result   Cardiomegaly with pulmonary edema and bilateral small pleural effusions   suggestive of congestive heart failure acute exacerbation. Superimposed   pneumonia, particularly in the left lung base is not excluded. IR GUIDED THORACENTESIS PLEURAL    (Results Pending)   XR CHEST 1 VIEW    (Results Pending)           PROBLEM LIST:  Principal Problem:    Diastolic CHF, acute ([address]  Active Problems:    Acute pulmonary edema (HCC)  Resolved Problems:    * No resolved hospital problems.  *      IMPRESSION:  HFrEF  HFpEF  Right heart failure/cor pulmonale  Bilateral pleural effusions left greater than right  Atrial fibrillation which is new possible previous NSTEMI  Stasis dermatitis lower extremities  CA, improving  Probable chronic kidney disease      PLAN:  Left thoracentesis today if there is enough fluid  Resume Eliquis after thoracentesis  Continue antibiotics for now  Check on culture results  Avoid nephrotoxic drugs  Check thoracentesis fluid results      ATTESTATION:  ICU Staff Physician note of personal involvement in Care  As the attending physician, I certify that I personally reviewed the patients history and personally examined the patient to confirm the physical findings described above,  And that I reviewed the relevant imaging studies and available reports. I also discussed the differential diagnosis and all of the proposed management plans with the patient and individuals accompanying the patient to this visit. They had the opportunity to ask questions about the proposed management plans and to have those questions answered. This patient has a high probability of sudden, clinically significant deterioration, which requires the highest level of physician preparedness to intervene urgently. I managed/supervised life or organ supporting interventions that required frequent physician assessment. I devoted my full attention to the direct care of this patient for the amount of time indicated below. Time I spent with the family or surrogate(s) is included only if the patient was incapable of providing the necessary information or participating in medical decisions - Time devoted to teaching and to any procedures I billed separately is not included.     CRITICAL CARE TIME:  3 4 minutes    Electronically signed by Jose Alberto Menendez MD on 7/26/2022 at 10:30 AM

## 2022-07-26 NOTE — PROGRESS NOTES
Physical Therapy    Physical Therapy Treatment Note/Plan of Care    Room #:  RA50/HW42-91  Patient Name: Babita Pineda  YOB: 1933  MRN: [id number]    Date of Service: 7/26/2022     Tentative placement recommendation: Subacute rehab  Equipment recommendation: To be determined      Evaluating Physical Therapist: Eva Tello, [facility]  #[id number]      Specific Provider Orders/Date/Referring Provider :  07/23/22 1315    PT eval and treat  Start:  07/23/22 1315,   End:  07/23/22 1315,   ONE TIME,   Standing Count:  1 Occurrences,   Jesus Acosta MD     Admitting Diagnosis:   Acute pulmonary edema ([address] [C77.9]  Diastolic CHF, acute ([address] [I50.31]  Acute respiratory failure with hypoxia ([address] [J96.01]  Atrial fibrillation, unspecified type ([address] [I48.91]    Admitted with  fall at home unresponsive   Surgery: none  Visit Diagnoses         Codes    Acute pulmonary edema ([address]    -  Primary J81.0    Acute respiratory failure with hypoxia ([address]     J96.01    Atrial fibrillation, unspecified type ([address]     I48.91    Postprocedural pneumothorax     J95.811            Patient Active Problem List   Diagnosis    Diastolic CHF, acute ([address]    Acute pulmonary edema (HCC)        ASSESSMENT of Current Deficits Patient exhibits decreased strength, balance, and endurance impairing functional mobility, transfers, gait , gait distance, and tolerance to activity are barriers to d/c and require skilled intervention during hospital stay to attain pre hospital level of function. Decreased strength, balance and endurance  increases patient's risk for fall. Patient able to perform all exercises with AAROM/AROM. Pt did not want to try and get out of bed due to being to tired.       PHYSICAL THERAPY  PLAN OF CARE       Physical therapy plan of care is established based on physician order,  patient diagnosis and clinical assessment    Current Treatment Recommendations:    -Bed Mobility: Lower extremity exercises , Upper extremity exercises , and Trunk control activities   -Sitting Balance: Incorporate reaching activities to activate trunk muscles , Facilitate active trunk muscle engagement , and Facilitate postural control in all planes   -Standing Balance: Perform strengthening exercises in standing to promote motor control with or without upper extremity support  and Instruct patient on adequate base of support to maintain balance  -Transfers: Provide instruction on proper hand and foot position for adequate transfer of weight onto lower extremities and use of gait device if needed and Cues for hand placement, technique and safety. Provide stabilization to prevent fall   -Gait: Gait training and Standing activities to improve: base of support, weight shift, weight bearing    -Endurance: Utilize Supervised activities to increase level of endurance to allow for safe functional mobility including transfers and gait     PT long term treatment goals are located in below grid    Patient and or family understand(s) diagnosis, prognosis, and plan of care. Frequency of treatments: Patient will be seen  daily. Prior Level of Function: Patient ambulated independently    Rehab Potential: good   for baseline    Past medical history:   No past medical history on file. No past surgical history on file.     SUBJECTIVE:    Precautions: , falls, alarm, and O2     Social history: Patient lives alone in a ranch home  with 10 steps  to enter with Rail  Sponge bathes grab bars    Equipment owned: None,       [id number]   How much difficulty turning over in bed?: A Lot  How much difficulty sitting down on / standing up from a chair with arms?: A Lot  How much difficulty moving from lying on back to sitting on side of bed?: A Lot  How much help from another person moving to and from a bed to a chair?: A Lot  How much help from another person needed to walk in hospital room?: Total  How much help from another person for climbing 3-5 steps with a railing?: Total  AM-PAC Inpatient Mobility Raw Score : 10  AM-PAC Inpatient T-Scale Score : 32.29  Mobility Inpatient CMS 0-100% Score: 76.75  Mobility Inpatient CMS G-Code Modifier : CL    Nursing cleared patient for PT treatment. OBJECTIVE;   Initial Evaluation  Date: 7/24/2022 Treatment Date:  7/26/2022       Short Term/ Long Term   Goals   Was pt agreeable to Eval/treatment? Yes yes To be met in 5 days   Pain level   0/10    No number given  Right knee pain    Bed Mobility    Rolling:  Moderate assist of 1    Supine to sit: Maximal assist of 1    Sit to supine: Maximal assist of 1    Scooting: Maximal assist of 1   Rolling: Not assessed    Supine to sit: Not assessed    Sit to supine: Not assessed    Scooting: Not assessed     Rolling: Minimal assist of 1    Supine to sit: Minimal assist of 1    Sit to supine: Minimal assist of 1    Scooting: Minimal assist of 1     Transfers Sit to stand: Maximal assist of 1 with full upright 3rd attempt Sit to stand: Not assessed       Sit to stand: Minimal assist of 1     Ambulation    not assessed  not assessed      20 feet using  wheeled walker with Moderate assist of 1    ROM impaired d/t swelling  Increase range of motion 10% of affected joints    Strength BUE:   3+/5  RLE:  3/5  LLE:  3/5  Increase strength in affected mm groups by 1/3 grade   Balance Sitting EOB:  fair    Dynamic Standing:  poor   Sitting EOB: not assessed   Dynamic Standing: not assessed    Sitting EOB:  good    Dynamic Standing: fair with wheeled walker      Patient is Alert & Oriented x person, place, time, and situation and follows directions    Sensation:  Patient  denies numbness/tingling   Edema:  yes bilateral lower extremities weeping Left lower extremity   Endurance: poor      Vitals:  1 liters nasal cannula   Blood Pressure at rest  Blood Pressure during session    Heart Rate at rest 81 Heart Rate during session 81-89   SPO2 at rest 95%  SPO2 during session 93-95%     Patient education  Patient educated on role of Physical Therapy, risks of immobility, safety and plan of care,  importance of mobility while in hospital , importance and purpose of adaptive device and adjusted to proper height for the patient. , and safety      Patient response to education:   Pt verbalized understanding Pt demonstrated skill Pt requires further education in this area   Yes Partial Yes      Treatment:  Patient practiced and was instructed/facilitated in the following treatment: Patient    performed supine exercises. Therapeutic Exercises:  ankle pumps, quad sets, heel slide, hip abduction/adduction, and straight leg raise,  x 15 reps. At end of session, patient in bed with  .  call light and phone within reach,  all lines and tubes intact, nursing notified. Patient would benefit from continued skilled Physical Therapy to improve functional independence and quality of life. Patient's/ family goals   get stronger    Time in  15:27  Time out 15:35    Total Treatment Time  8 minutes        CPT codes:    Therapeutic exercises (36069)   8 minutes  1 unit(s)    Fred Bond  [facility]  LIC # [id number]

## 2022-07-26 NOTE — PROGRESS NOTES
Admitting Date and Time: 7/21/2022  1:41 PM  Admit Dx: Acute pulmonary edema (HCC) [X58.1]  Diastolic CHF, acute (HCC) [I50.31]  Acute respiratory failure with hypoxia (HCC) [J96.01]  Atrial fibrillation, unspecified type ([facility] [I48.91]    Subjective:  Not able to recall events that lead to hi sstay  he is able to discuss which region he will live in after hospital (near son)  Per RN: no new issues    ROS: denies fever, chills, cp, sob, n/v, HA unless stated above.      piperacillin-tazobactam  3,375 mg IntraVENous Q8H    [START ON 7/27/2022] amoxicillin-clavulanate  1 tablet Oral 2 times per day    lactobacillus  1 capsule Oral Daily    furosemide  20 mg IntraVENous Daily    hydrALAZINE  25 mg Oral 3 times per day    carvedilol  3.125 mg Oral BID WC    pantoprazole  40 mg Oral QAM AC    [Held by provider] apixaban  2.5 mg Oral BID    sodium chloride flush  5-40 mL IntraVENous 2 times per day    heparin flush  1 mL IntraVENous 2 times per day    amiodarone  200 mg Oral Daily    sodium chloride flush  5-40 mL IntraVENous 2 times per day     sodium chloride flush, 5-40 mL, PRN  sodium chloride, , PRN  heparin flush, 1 mL, PRN  sodium chloride flush, 5-40 mL, PRN  sodium chloride, , PRN  ondansetron, 4 mg, Q8H PRN   Or  ondansetron, 4 mg, Q6H PRN  polyethylene glycol, 17 g, Daily PRN  acetaminophen, 650 mg, Q6H PRN   Or  acetaminophen, 650 mg, Q6H PRN  perflutren lipid microspheres, 1.5 mL, ONCE PRN       Objective:    /80   Pulse 81   Temp 98.6 °F (37 °C) (Oral)   Resp 19   Ht 6' (1.829 m)   Wt 248 lb 14.4 oz (112.9 kg)   SpO2 95%   BMI 33.76 kg/m²   General Appearance: alert and oriented to person, place and time and in no acute distress  Skin: warm and dry  Head: normocephalic and atraumatic  Eyes: pupils equal, round, and reactive to light, extraocular eye movements intact, conjunctivae normal  Neck: neck supple and non tender without mass   Pulmonary/Chest: clear to auscultation bilaterally- no wheezes, rales or rhonchi, normal air movement, no respiratory distress  Cardiovascular: normal rate, normal S1 and S2 and no carotid bruits  Abdomen: soft, non-tender, non-distended, normal bowel sounds, no masses or organomegaly  Extremities: no cyanosis, no clubbing   less anasarca  Neurologic: no cranial nerve deficit and speech normal  Not able to process and remember conversation      Recent Labs     07/24/22 0514 07/25/22 0528 07/26/22 0522    141 140   K 4.3 3.9 4.0    106 104   CO2 27 27 28   BUN 55* 51* 42*   CREATININE 1.9* 1.5* 1.5*   GLUCOSE 103* 98 99   CALCIUM 7.6* 7.5* 7.4*         Recent Labs     07/24/22 0514 07/25/22 0528 07/26/22 0522   ALKPHOS 76 83 97   PROT 5.3* 5.4* 5.2*   LABALBU 2.7* 2.5* 2.6*   BILITOT 0.7 0.9 0.9   AST 51* 40* 37   ALT 30 26 25         Recent Labs     07/24/22 0514 07/25/22 0528 07/26/22 0522   WBC 8.5 7.1 7.4   RBC 4.16 4.16 4.00   HGB 12.5 12.3* 11.8*   HCT 39.9 39.6 38.0   MCV 95.9 95.2 95.0   MCH 30.0 29.6 29.5   MCHC 31.3* 31.1* 31.1*   RDW 16.2* 16.2* 16.1*    212 213   MPV 9.2 9.6 9.9             Radiology:   XR CHEST 1 VIEW   Final Result   1. No pneumothorax. 2. Bibasilar opacities notable on the left related to pneumonia, atelectasis,   and probable pleural effusions. XR CHEST PORTABLE   Final Result   No significant interval change of the past 24 hours. XR CHEST PORTABLE   Final Result   No significant changes since the previous study of July 24. XR CHEST PORTABLE   Final Result   No significant changes since the July 23. XR CHEST PORTABLE   Final Result   Persistent findings of a decompensated congestive heart failure. No   significant changes since July 21st.         CT Head WO Contrast   Final Result   1. No acute intracranial abnormality. 2. Chronic small vessel ischemic disease and generalized cerebral volume loss. 3. Rather diffuse scalp swelling, right greater than left.          XR CHEST PORTABLE   Final Result   Cardiomegaly with pulmonary edema and bilateral small pleural effusions   suggestive of congestive heart failure acute exacerbation. Superimposed   pneumonia, particularly in the left lung base is not excluded. IR GUIDED THORACENTESIS PLEURAL    (Results Pending)       Assessment:  Principal Problem:    Diastolic CHF, acute ([address]  Active Problems:    Acute pulmonary edema (HCC)  Resolved Problems:    * No resolved hospital problems. *      Plan: History of present illness from History and Physical:   80 y.o. male with a history of CAD s/p CABG presents with unresponsiveness. Initially in the ER no history was able to be obtained. Later son came in and helped with the following. Patient is socially active. He was last seen by the son last week however was seen by his friends 1 or 2 days ago. He was found on the floor at his home. He was hypoxic. When he presented into the emergency room the emergency room doctor said that he was satting in the 70%. BiPAP was placed approximately 1 to 2 hours prior to my assessment of the patient. He had not been responsive or coherent prior to that. The son discussed CODE STATUS with the ED doctor so the patient is wishing to be a DNR DNI  We are still not able to obtain details as mentioned above and below. During my assessment the son and other family members apparently have left the department    During his ER course he was also given a Oneil, nitro drip, and Lasix 80 mg IV x1.       2D echo 7/22/2022:  Technically sub-optimal images. Normal left ventricular chamber size. Mild global hypokinesis, abnormal septal motion, LV EF 40 +/- 3%. Indeterminate LV diastolic function. Left atrium is moderately enlarged. Severely increased left atrial volume. Interatrial septum not well visualized but appears intact. Right ventricle enlarged with decreased function. Moderately enlarged right atrium.    Moderate eccentric mitral regurgitation present. No mitral valve prolapse. No hemodynamically significant aortic stenosis. There is mild aortic regurgitation. There is mild to moderate tricuspid regurgitation. Moderate Pulmonary hypertension, RVSP 62mmHg. Normal aortic root size. No evidence of pericardial effusion. Pleural effusion noted. The inferior vena cava dilated with decreased respiratory variation. No intra cardiac mass or thrombus. No comparison study available. Acute decompensated systolic heart failure causing acute respiratory failure. Clinically responding well to BiPAP. Lasix continue small dose. Diuresing adequately  Posterior located bilateral pleural effusion per CxR. Pulmonary plan for thoracentesis on 7/26  A. fib RVR rate. Antiarrhythmics (Coreg, hydralazine) adjusted per cardiologist who signed off on 7/24    High trop: Likely demand ischemia from hypoxic respiratory failure and Rhabdo. Deferring further w/u    Antonio:  Crt steady decrease from 2.0--> 1.9--> 1.5--> 1.5.  monitor closely  Dementia presumptively. Spoke to son on 7/25 Tarahcassandracarmella David who says that patient has been cognitively impaired for at least a couple of years and has refused to see a doctor. He is not able to retain and learn new information  Life threatening noncompliance. This might relate to possible toxic encephalopathy due to #1 with previously undiagnosed dementia. He does not have capacity to make his own decisions at this time  DVT prophylaxis: Eliquis so cardio holding asa  DNR CCA. Disposition needs SNF    NOTE: This report was transcribed using voice recognition software. Every effort was made to ensure accuracy; however, inadvertent computerized transcription errors may be present.      Electronically signed by Gregorio Gonsales MD on 7/26/2022 at 2:55 PM

## 2022-07-27 LAB
ALBUMIN SERPL-MCNC: 2.4 G/DL (ref 3.5–5.2)
ALP BLD-CCNC: 90 U/L (ref 40–129)
ALT SERPL-CCNC: 20 U/L (ref 0–40)
ANION GAP SERPL CALCULATED.3IONS-SCNC: 6 MMOL/L (ref 7–16)
AST SERPL-CCNC: 27 U/L (ref 0–39)
BILIRUB SERPL-MCNC: 0.7 MG/DL (ref 0–1.2)
BUN BLDV-MCNC: 32 MG/DL (ref 6–23)
CALCIUM SERPL-MCNC: 7.1 MG/DL (ref 8.6–10.2)
CHLORIDE BLD-SCNC: 107 MMOL/L (ref 98–107)
CO2: 28 MMOL/L (ref 22–29)
CREAT SERPL-MCNC: 1.4 MG/DL (ref 0.7–1.2)
GFR AFRICAN AMERICAN: 58
GFR NON-AFRICAN AMERICAN: 48 ML/MIN/1.73
GLUCOSE BLD-MCNC: 89 MG/DL (ref 74–99)
GRAM STAIN ORDERABLE: NORMAL
POTASSIUM SERPL-SCNC: 4 MMOL/L (ref 3.5–5)
SODIUM BLD-SCNC: 141 MMOL/L (ref 132–146)
TOTAL PROTEIN: 4.9 G/DL (ref 6.4–8.3)

## 2022-07-27 PROCEDURE — 6370000000 HC RX 637 (ALT 250 FOR IP): Performed by: INTERNAL MEDICINE

## 2022-07-27 PROCEDURE — 80053 COMPREHEN METABOLIC PANEL: CPT

## 2022-07-27 PROCEDURE — 99232 SBSQ HOSP IP/OBS MODERATE 35: CPT | Performed by: INTERNAL MEDICINE

## 2022-07-27 PROCEDURE — 6360000002 HC RX W HCPCS: Performed by: INTERNAL MEDICINE

## 2022-07-27 PROCEDURE — 2580000003 HC RX 258: Performed by: INTERNAL MEDICINE

## 2022-07-27 PROCEDURE — 1200000000 HC SEMI PRIVATE

## 2022-07-27 PROCEDURE — 2700000000 HC OXYGEN THERAPY PER DAY

## 2022-07-27 PROCEDURE — P9047 ALBUMIN (HUMAN), 25%, 50ML: HCPCS | Performed by: INTERNAL MEDICINE

## 2022-07-27 PROCEDURE — 36415 COLL VENOUS BLD VENIPUNCTURE: CPT

## 2022-07-27 PROCEDURE — 97110 THERAPEUTIC EXERCISES: CPT

## 2022-07-27 PROCEDURE — 97530 THERAPEUTIC ACTIVITIES: CPT

## 2022-07-27 RX ORDER — FUROSEMIDE 10 MG/ML
20 INJECTION INTRAMUSCULAR; INTRAVENOUS 2 TIMES DAILY
Status: DISCONTINUED | OUTPATIENT
Start: 2022-07-27 | End: 2022-07-28 | Stop reason: HOSPADM

## 2022-07-27 RX ORDER — ALBUMIN (HUMAN) 12.5 G/50ML
25 SOLUTION INTRAVENOUS 2 TIMES DAILY
Status: DISCONTINUED | OUTPATIENT
Start: 2022-07-27 | End: 2022-07-28 | Stop reason: HOSPADM

## 2022-07-27 RX ADMIN — ALBUMIN (HUMAN) 25 G: 0.25 INJECTION, SOLUTION INTRAVENOUS at 18:29

## 2022-07-27 RX ADMIN — FUROSEMIDE 20 MG: 10 INJECTION, SOLUTION INTRAMUSCULAR; INTRAVENOUS at 08:38

## 2022-07-27 RX ADMIN — AMOXICILLIN AND CLAVULANATE POTASSIUM 1 TABLET: 500; 125 TABLET, FILM COATED ORAL at 21:33

## 2022-07-27 RX ADMIN — Medication 10 ML: at 22:31

## 2022-07-27 RX ADMIN — PANTOPRAZOLE SODIUM 40 MG: 40 TABLET, DELAYED RELEASE ORAL at 06:06

## 2022-07-27 RX ADMIN — Medication 10 ML: at 08:40

## 2022-07-27 RX ADMIN — HEPARIN 100 UNITS: 100 SYRINGE at 08:40

## 2022-07-27 RX ADMIN — HEPARIN 100 UNITS: 100 SYRINGE at 22:06

## 2022-07-27 RX ADMIN — CARVEDILOL 3.12 MG: 3.12 TABLET, FILM COATED ORAL at 08:38

## 2022-07-27 RX ADMIN — Medication 1 CAPSULE: at 08:38

## 2022-07-27 RX ADMIN — CARVEDILOL 3.12 MG: 3.12 TABLET, FILM COATED ORAL at 18:18

## 2022-07-27 RX ADMIN — FUROSEMIDE 20 MG: 10 INJECTION, SOLUTION INTRAMUSCULAR; INTRAVENOUS at 18:17

## 2022-07-27 RX ADMIN — AMIODARONE HYDROCHLORIDE 200 MG: 200 TABLET ORAL at 08:38

## 2022-07-27 RX ADMIN — AMOXICILLIN AND CLAVULANATE POTASSIUM 1 TABLET: 500; 125 TABLET, FILM COATED ORAL at 10:51

## 2022-07-27 RX ADMIN — APIXABAN 2.5 MG: 2.5 TABLET, FILM COATED ORAL at 21:34

## 2022-07-27 RX ADMIN — HYDRALAZINE HYDROCHLORIDE 25 MG: 25 TABLET, FILM COATED ORAL at 21:34

## 2022-07-27 ASSESSMENT — PAIN SCALES - GENERAL
PAINLEVEL_OUTOF10: 0
PAINLEVEL_OUTOF10: 0

## 2022-07-27 NOTE — PROGRESS NOTES
Physical Therapy    Physical Therapy Treatment Note/Plan of Care    Room #:  6876/2261-44  Patient Name: Tiffany Payton  YOB: 1933  MRN: [id number]    Date of Service: 7/27/2022     Tentative placement recommendation: Subacute rehab  Equipment recommendation: To be determined      Evaluating Physical Therapist: Moiz Munroe, [facility]  #[id number]      Specific Provider Orders/Date/Referring Provider :  07/23/22 1315    PT eval and treat  Start:  07/23/22 1315,   End:  07/23/22 1315,   ONE TIME,   Standing Count:  1 Occurrences,   Va Frey MD     Admitting Diagnosis:   Acute pulmonary edema ([address] [B69.2]  Diastolic CHF, acute ([address] [I50.31]  Acute respiratory failure with hypoxia ([address] [J96.01]  Atrial fibrillation, unspecified type ([address] [I48.91]    Admitted with  fall at home unresponsive   Surgery: none  Visit Diagnoses         Codes    Acute pulmonary edema ([address]    -  Primary J81.0    Acute respiratory failure with hypoxia ([address]     J96.01    Atrial fibrillation, unspecified type ([address]     I48.91    Postprocedural pneumothorax     J95.811            Patient Active Problem List   Diagnosis    Diastolic CHF, acute ([address]    Acute pulmonary edema (HCC)        ASSESSMENT of Current Deficits Patient exhibits decreased strength, balance, and endurance impairing functional mobility, transfers, gait , gait distance, and tolerance to activity are barriers to d/c and require skilled intervention during hospital stay to attain pre hospital level of function. Decreased strength, balance and endurance  increases patient's risk for fall. Patient able to perform all exercises with AAROM/AROM. Patient assisted with rolling in bed for bedpan placement and removal and assist with hygiene. Patient would benefit from cont therapy due to deconditioning and to decrease risk of falls.        PHYSICAL THERAPY  PLAN OF CARE       Physical therapy plan of care is established based on physician order, patient diagnosis and clinical assessment    Current Treatment Recommendations:    -Bed Mobility: Lower extremity exercises , Upper extremity exercises , and Trunk control activities   -Sitting Balance: Incorporate reaching activities to activate trunk muscles , Facilitate active trunk muscle engagement , and Facilitate postural control in all planes   -Standing Balance: Perform strengthening exercises in standing to promote motor control with or without upper extremity support  and Instruct patient on adequate base of support to maintain balance  -Transfers: Provide instruction on proper hand and foot position for adequate transfer of weight onto lower extremities and use of gait device if needed and Cues for hand placement, technique and safety. Provide stabilization to prevent fall   -Gait: Gait training and Standing activities to improve: base of support, weight shift, weight bearing    -Endurance: Utilize Supervised activities to increase level of endurance to allow for safe functional mobility including transfers and gait     PT long term treatment goals are located in below grid    Patient and or family understand(s) diagnosis, prognosis, and plan of care. Frequency of treatments: Patient will be seen  daily. Prior Level of Function: Patient ambulated independently    Rehab Potential: good   for baseline    Past medical history:   No past medical history on file. No past surgical history on file. SUBJECTIVE:    Precautions: , falls, alarm, and O2     Social history: Patient lives alone in a ranch home  with 10 steps  to enter with Rail  Sponge bathes grab bars    Equipment owned: None,       [address] cleared patient for PT treatment. OBJECTIVE;   Initial Evaluation  Date: 7/24/2022 Treatment Date:  7/27/2022       Short Term/ Long Term   Goals   Was pt agreeable to Eval/treatment?  Yes yes To be met in 5 days   Pain level   0/10    0/10    Bed Mobility Rolling: Moderate assist of 1    Supine to sit: Maximal assist of 1    Sit to supine: Maximal assist of 1    Scooting: Maximal assist of 1   Rolling: Maximal assist of 1   Supine to sit: Not assessed    Sit to supine: Not assessed    Scooting: Not assessed     Rolling: Minimal assist of 1    Supine to sit: Minimal assist of 1    Sit to supine: Minimal assist of 1    Scooting: Minimal assist of 1     Transfers Sit to stand: Maximal assist of 1 with full upright 3rd attempt Sit to stand: Not assessed       Sit to stand: Minimal assist of 1     Ambulation    not assessed  not assessed      20 feet using  wheeled walker with Moderate assist of 1    ROM impaired d/t swelling  Increase range of motion 10% of affected joints    Strength BUE:   3+/5  RLE:  3/5  LLE:  3/5  Increase strength in affected mm groups by 1/3 grade   Balance Sitting EOB:  fair    Dynamic Standing:  poor   Sitting EOB: not assessed   Dynamic Standing: not assessed    Sitting EOB:  good    Dynamic Standing: fair with wheeled walker      Patient is Alert & Oriented x person, place, time, and situation and follows directions    Sensation:  Patient  denies numbness/tingling   Edema:  yes bilateral lower extremities weeping Left lower extremity   Endurance: poor      Vitals:  2 liters nasal cannula   Blood Pressure at rest  Blood Pressure during session    Heart Rate at rest  Heart Rate during session    SPO2 at rest %  SPO2 during session %     Patient education  Patient educated on role of Physical Therapy, risks of immobility, safety and plan of care,  importance of mobility while in hospital , ankle pumps, quad set and glut set for edema control, blood clot prevention, importance and purpose of adaptive device and adjusted to proper height for the patient. , and safety      Patient response to education:   Pt verbalized understanding Pt demonstrated skill Pt requires further education in this area   Yes Partial Yes      Treatment:  Patient practiced and was instructed/facilitated in the following treatment: Patient assisted with supine exercises in bed and   assisted on/off bedpan and with hygiene. Foam heel floaters donned post tx. Therapeutic Exercises:  ankle pumps, hip abduction/adduction, and straight leg raise,  x 10-15 reps. At end of session, patient in bed with  .  call light and phone within reach,  all lines and tubes intact, nursing notified. Patient would benefit from continued skilled Physical Therapy to improve functional independence and quality of life.          Patient's/ family goals   get stronger    Time in  1253  Time out 140    Total Treatment Time  47 minutes    CPT codes:  Therapeutic activities (19931)   17 minutes  1 unit(s)  Therapeutic exercises (78545)   23 minutes  2 unit(s)  Non billable time 7 minutes    Zoë Love, PTA  #[id number]

## 2022-07-27 NOTE — PROGRESS NOTES
Admitting Date and Time: 7/21/2022  1:41 PM  Admit Dx: Acute pulmonary edema (HCC) [H93.6]  Diastolic CHF, acute ([facility]) [I50.31]  Acute respiratory failure with hypoxia (HCC) [J96.01]  At[facility], unspecified [facility] ([facility] [I48.91]    Subjective:  No new complaints. He is memory impaired. He seems to have gotten used to hospital routine but with poor recall and processing.   He seems to understand discharge plan better  Per RN: no new issues    ROS: denies fever, chills, cp, sob, n/v, HA unless stated above.     amoxicillin-clavulanate  1 tablet Oral 2 times per day    lactobacillus  1 capsule Oral Daily    furosemide  20 mg IntraVENous Daily    hydrALAZINE  25 mg Oral 3 times per day    carvedilol  3.125 mg Oral BID WC    pantoprazole  40 mg Oral QAM AC    [Held by provider] apixaban  2.5 mg Oral BID    sodium chloride flush  5-40 mL IntraVENous 2 times per day    heparin flush  1 mL IntraVENous 2 times per day    amiodarone  200 mg Oral Daily    sodium chloride flush  5-40 mL IntraVENous 2 times per day     sodium chloride flush, 5-40 mL, PRN  sodium chloride, , PRN  heparin flush, 1 mL, PRN  sodium chloride flush, 5-40 mL, PRN  sodium chloride, , PRN  ondansetron, 4 mg, Q8H PRN   Or  ondansetron, 4 mg, Q6H PRN  polyethylene glycol, 17 g, Daily PRN  acetaminophen, 650 mg, Q6H PRN   Or  acetaminophen, 650 mg, Q6H PRN  perflutren lipid microspheres, 1.5 mL, ONCE PRN       Objective:    BP (!) 95/54   Pulse 62   Temp 98.1 °F (36.7 °C) (Oral)   Resp 16   Ht 6' (1.829 m)   Wt 264 lb (119.7 kg)   SpO2 96%   BMI 35.80 kg/m²   General Appearance: alert and oriented to person, place and time and in no acute distress  Skin: warm and dry  Head: normocephalic and atraumatic  Eyes: pupils equal, round, and reactive to light, extraocular eye movements intact, conjunctivae normal  Neck: neck supple and non tender without mass   Pulmonary/Chest: clear to auscultation bilaterally- no wheezes, rales or rhonchi, normal air movement, no respiratory distress  Cardiovascular: normal rate, normal S1 and S2 and no carotid bruits  Abdomen: soft, non-tender, non-distended, normal bowel sounds, no masses or organomegaly  Extremities: no cyanosis, no clubbing    Anasarca still presents but overall improved  Neurologic: no cranial nerve deficit and speech normal  Not able to process and remember conversation      Recent Labs     07/25/22 0528 07/26/22 0522 07/27/22  0740    140 141   K 3.9 4.0 4.0    104 107   CO2 27 28 28   BUN 51* 42* 32*   CREATININE 1.5* 1.5* 1.4*   GLUCOSE 98 99 89   CALCIUM 7.5* 7.4* 7.1*         Recent Labs     07/25/22 0528 07/26/22 0522 07/27/22  0740   ALKPHOS 83 97 90   PROT 5.4* 5.2* 4.9*   LABALBU 2.5* 2.6* 2.4*   BILITOT 0.9 0.9 0.7   AST 40* 37 27   ALT 26 25 20         Recent Labs     07/25/22 0528 07/26/22  0522   WBC 7.1 7.4   RBC 4.16 4.00   HGB 12.3* 11.8*   HCT 39.6 38.0   MCV 95.2 95.0   MCH 29.6 29.5   MCHC 31.1* 31.1*   RDW 16.2* 16.1*    213   MPV 9.6 9.9             Radiology:   XR CHEST 1 VIEW   Final Result   1. No pneumothorax. 2. Bibasilar opacities notable on the left related to pneumonia, atelectasis,   and probable pleural effusions. XR CHEST PORTABLE   Final Result   No significant interval change of the past 24 hours. XR CHEST PORTABLE   Final Result   No significant changes since the previous study of July 24. XR CHEST PORTABLE   Final Result   No significant changes since the July 23. XR CHEST PORTABLE   Final Result   Persistent findings of a decompensated congestive heart failure. No   significant changes since July 21st.         CT Head WO Contrast   Final Result   1. No acute intracranial abnormality. 2. Chronic small vessel ischemic disease and generalized cerebral volume loss. 3. Rather diffuse scalp swelling, right greater than left.          XR CHEST PORTABLE   Final Result   Cardiomegaly with pulmonary edema and bilateral small pleural effusions   suggestive of congestive heart failure acute exacerbation. Superimposed   pneumonia, particularly in the left lung base is not excluded. IR GUIDED THORACENTESIS PLEURAL    (Results Pending)       Assessment:  Principal Problem:    Diastolic CHF, acute ([address]  Active Problems:    Acute pulmonary edema (HCC)  Resolved Problems:    * No resolved hospital problems. *      Plan: History of present illness from History and Physical:   80 y.o. male with a history of CAD s/p CABG presents with unresponsiveness. Initially in the ER no history was able to be obtained. Later son came in and helped with the following. Patient is socially active. He was last seen by the son last week however was seen by his friends 1 or 2 days ago. He was found on the floor at his home. He was hypoxic. When he presented into the emergency room the emergency room doctor said that he was satting in the 70%. BiPAP was placed approximately 1 to 2 hours prior to my assessment of the patient. He had not been responsive or coherent prior to that. The son discussed CODE STATUS with the ED doctor so the patient is wishing to be a DNR DNI  We are still not able to obtain details as mentioned above and below. During my assessment the son and other family members apparently have left the department    During his ER course he was also given a Oneil, nitro drip, and Lasix 80 mg IV x1.       2D echo 7/22/2022:  Technically sub-optimal images. Normal left ventricular chamber size. Mild global hypokinesis, abnormal septal motion, LV EF 40 +/- 3%. Indeterminate LV diastolic function. Left atrium is moderately enlarged. Severely increased left atrial volume. Interatrial septum not well visualized but appears intact. Right ventricle enlarged with decreased function. Moderately enlarged right atrium. Moderate eccentric mitral regurgitation present. No mitral valve prolapse.    No hemodynamically significant aortic stenosis. There is mild aortic regurgitation. There is mild to moderate tricuspid regurgitation. Moderate Pulmonary hypertension, RVSP 62mmHg. Normal aortic root size. No evidence of pericardial effusion. Pleural effusion noted. The inferior vena cava dilated with decreased respiratory variation. No intra cardiac mass or thrombus. No comparison study available. Acute decompensated systolic heart failure causing acute respiratory failure. Clinically responded well to BiPAP. Lasix continue small dose. Diuresing adequately  Posterior located bilateral pleural effusion per CxR. S/p thoracentesis on 7/26  A. fib RVR rate. Antiarrhythmics (Coreg, hydralazine) adjusted per cardiologist who signed off on 7/24    High trop: Likely demand ischemia from hypoxic respiratory failure and Rhabdo. Deferring further w/u    Antonio:  Crt steady decrease from 2.0--> 1.9--> 1.5--> 1.5--> 1.4.  will increase lasix to q12 and give it with albumin. Reassess after several doses but keep monitoring crt  Dementia presumptively. D/w son Ingrid Pale 7/25: patient has been cognitively impaired for at least a couple of years and has refused to see a doctor. He is not able to retain and learn new information  Life threatening noncompliance. This might relate to possible toxic encephalopathy due to #1 with previously undiagnosed dementia. He does not have capacity to make his own decisions at this time  DVT prophylaxis: Eliquis held for thorocentesis but I restarted it.  cardio holding asa  DNR CCA. Disposition needs SNF    NOTE: This report was transcribed using voice recognition software. Every effort was made to ensure accuracy; however, inadvertent computerized transcription errors may be present.      Electronically signed by Kristian Leon MD on 7/27/2022 at 1:34 PM

## 2022-07-27 NOTE — CARE COORDINATION
7/27/2022 CM received call from Aashish Lowery to inform CM that [facility] is following this patient for admission. CM placed call to [facility] ([phone])-[phone] was on another line but informed person answering the phone at [facility] that pt was accepted and they will start precert. Contact for [facility] who is currently following this patient. Tiffani updated.  Electronically signed by CUONG Reddy on 7/27/2022 at 11:20 AM

## 2022-07-27 NOTE — PROGRESS NOTES
Physician Progress Note      Melanie Tee  CSN #:                  [id number]  :                       1933  ADMIT DATE:       2022 1:41 PM  DISCH DATE:  RESPONDING  PROVIDER #:        NEHEMIAS LEIGH        QUERY TEXT:    Stage of Chronic Kidney Disease: Please provide further specificity, if known. Clinical indicators include: bun/creatinine, bun, creatinine, gfr, ckd,   chronic kidney disease  Options provided:  -- Chronic kidney disease stage 1  -- Chronic kidney disease stage 2  -- Chronic kidney disease stage 3  -- Chronic kidney disease stage 3a  -- Chronic kidney disease stage 3b  -- Chronic kidney disease stage 4  -- Chronic kidney disease stage 5  -- Chronic kidney disease stage 5, requiring dialysis  -- End stage renal disease  -- Other - I will add my own diagnosis  -- Disagree - Not applicable / Not valid  -- Disagree - Clinically Unable to determine / Unknown        PROVIDER RESPONSE TEXT:    The patient has chronic kidney disease stage 3.       Electronically signed by:  NEHEMIAS Katz 2022 1:32 PM

## 2022-07-27 NOTE — PROGRESS NOTES
Pulmonary/Critical Care Progress Note    We are following patient for HFrEF, HFpEF, right ventricular dysfunction, cor pulmonale, status post left thoracentesis showing fluid to be a transudate presumably from heart failure, pulmonary hypertension, atrial fibrillation, CA, stasis dermatitis of lower extremities    SUBJECTIVE:  The patient is feeling well after thoracentesis of 300 cc was done yesterday. Pleural fluid chemistries are consistent with a transudate with low LDH ratio and low protein ratio. The pH of the pleural fluid is 7.38. Cytology is pending. He is being given albumin and Lasix currently. He is in atrial fibrillation on amiodarone and carvedilol as well as apixaban. MEDICATIONS:   furosemide  20 mg IntraVENous BID    albumin human  25 g IntraVENous BID    amoxicillin-clavulanate  1 tablet Oral 2 times per day    lactobacillus  1 capsule Oral Daily    hydrALAZINE  25 mg Oral 3 times per day    carvedilol  3.125 mg Oral BID WC    pantoprazole  40 mg Oral QAM AC    apixaban  2.5 mg Oral BID    sodium chloride flush  5-40 mL IntraVENous 2 times per day    heparin flush  1 mL IntraVENous 2 times per day    amiodarone  200 mg Oral Daily    sodium chloride flush  5-40 mL IntraVENous 2 times per day      sodium chloride Stopped (07/23/22 2205)    sodium chloride       sodium chloride flush, sodium chloride, heparin flush, sodium chloride flush, sodium chloride, ondansetron **OR** ondansetron, polyethylene glycol, acetaminophen **OR** acetaminophen, perflutren lipid microspheres      REVIEW OF SYSTEMS:  Constitutional: Denies fever, weight loss, night sweats, and fatigue  Skin: Denies pigmentation, dark lesions, and rashes   HEENT: Denies hearing loss, tinnitus, ear drainage, epistaxis, sore throat, and hoarseness. Cardiovascular: Denies palpitations, chest pain, and chest pressure. Respiratory: Denies cough, dyspnea at rest, hemoptysis, apnea, and choking.   Gastrointestinal: Denies nausea, vomiting, poor appetite, diarrhea, heartburn or reflux  Genitourinary: Denies dysuria, frequency, urgency or hematuria  Musculoskeletal: Denies myalgias, muscle weakness, and bone pain  Neurological: Denies dizziness, vertigo, headache, and focal weakness  Psychological: Denies anxiety and depression  Endocrine: Denies heat intolerance and cold intolerance  Hematopoietic/Lymphatic: Denies bleeding problems and blood transfusions    OBJECTIVE:  Vitals:    07/27/22 1245   BP: (!) 95/54   Pulse: 62   Resp:    Temp:    SpO2:      FiO2 : 33 %  O2 Flow Rate (L/min): 1 L/min  O2 Device: Nasal cannula    PHYSICAL EXAM:  Constitutional: No fever, chills, diaphoresis  Skin: No skin rash, no skin breakdown  HEENT: Unremarkable  Neck: No JVD, lymphadenopathy, thyromegaly  Cardiovascular: S1, S2 irregular consistent with atrial fibrillation. No S3 or rubs present  Respiratory: Minimal inspiratory crackles at bases on both sides. No wheezes  Gastrointestinal: Soft, obese, nontender  Genitourinary: No CVA tenderness  Extremities: Trace edema at ankles only. Chronic venous stasis changes are seen  Neurological: Awake, alert, oriented x3. No evidence of focal motor or sensory deficits  Psychological: In good spirits.   Appropriate affect    LABS:  WBC   Date Value Ref Range Status   07/26/2022 7.4 4.5 - 11.5 E9/L Final   07/25/2022 7.1 4.5 - 11.5 E9/L Final   07/24/2022 8.5 4.5 - 11.5 E9/L Final     Hemoglobin   Date Value Ref Range Status   07/26/2022 11.8 (L) 12.5 - 16.5 g/dL Final   07/25/2022 12.3 (L) 12.5 - 16.5 g/dL Final   07/24/2022 12.5 12.5 - 16.5 g/dL Final     Hematocrit   Date Value Ref Range Status   07/26/2022 38.0 37.0 - 54.0 % Final   07/25/2022 39.6 37.0 - 54.0 % Final   07/24/2022 39.9 37.0 - 54.0 % Final     MCV   Date Value Ref Range Status   07/26/2022 95.0 80.0 - 99.9 fL Final   07/25/2022 95.2 80.0 - 99.9 fL Final   07/24/2022 95.9 80.0 - 99.9 fL Final     Platelets   Date Value Ref Range Status 07/26/2022 213 130 - 450 E9/L Final   07/25/2022 212 130 - 450 E9/L Final   07/24/2022 195 130 - 450 E9/L Final     Sodium   Date Value Ref Range Status   07/27/2022 141 132 - 146 mmol/L Final   07/26/2022 140 132 - 146 mmol/L Final   07/25/2022 141 132 - 146 mmol/L Final     Potassium   Date Value Ref Range Status   07/27/2022 4.0 3.5 - 5.0 mmol/L Final   07/26/2022 4.0 3.5 - 5.0 mmol/L Final   07/25/2022 3.9 3.5 - 5.0 mmol/L Final     Potassium reflex Magnesium   Date Value Ref Range Status   07/21/2022 5.0 3.5 - 5.0 mmol/L Final     Chloride   Date Value Ref Range Status   07/27/2022 107 98 - 107 mmol/L Final   07/26/2022 104 98 - 107 mmol/L Final   07/25/2022 106 98 - 107 mmol/L Final     CO2   Date Value Ref Range Status   07/27/2022 28 22 - 29 mmol/L Final   07/26/2022 28 22 - 29 mmol/L Final   07/25/2022 27 22 - 29 mmol/L Final     BUN   Date Value Ref Range Status   07/27/2022 32 (H) 6 - 23 mg/dL Final   07/26/2022 42 (H) 6 - 23 mg/dL Final   07/25/2022 51 (H) 6 - 23 mg/dL Final     Creatinine   Date Value Ref Range Status   07/27/2022 1.4 (H) 0.7 - 1.2 mg/dL Final   07/26/2022 1.5 (H) 0.7 - 1.2 mg/dL Final   07/25/2022 1.5 (H) 0.7 - 1.2 mg/dL Final     Glucose   Date Value Ref Range Status   07/27/2022 89 74 - 99 mg/dL Final   07/26/2022 99 74 - 99 mg/dL Final   07/25/2022 98 74 - 99 mg/dL Final     Calcium   Date Value Ref Range Status   07/27/2022 7.1 (L) 8.6 - 10.2 mg/dL Final   07/26/2022 7.4 (L) 8.6 - 10.2 mg/dL Final   07/25/2022 7.5 (L) 8.6 - 10.2 mg/dL Final     Total Protein   Date Value Ref Range Status   07/27/2022 4.9 (L) 6.4 - 8.3 g/dL Final   07/26/2022 5.2 (L) 6.4 - 8.3 g/dL Final   07/25/2022 5.4 (L) 6.4 - 8.3 g/dL Final     Albumin   Date Value Ref Range Status   07/27/2022 2.4 (L) 3.5 - 5.2 g/dL Final   07/26/2022 2.6 (L) 3.5 - 5.2 g/dL Final   07/25/2022 2.5 (L) 3.5 - 5.2 g/dL Final     Total Bilirubin   Date Value Ref Range Status   07/27/2022 0.7 0.0 - 1.2 mg/dL Final   07/26/2022 0.9 0.0 - 1.2 mg/dL Final   07/25/2022 0.9 0.0 - 1.2 mg/dL Final     Alkaline Phosphatase   Date Value Ref Range Status   07/27/2022 90 40 - 129 U/L Final   07/26/2022 97 40 - 129 U/L Final   07/25/2022 83 40 - 129 U/L Final     AST   Date Value Ref Range Status   07/27/2022 27 0 - 39 U/L Final   07/26/2022 37 0 - 39 U/L Final   07/25/2022 40 (H) 0 - 39 U/L Final     ALT   Date Value Ref Range Status   07/27/2022 20 0 - 40 U/L Final   07/26/2022 25 0 - 40 U/L Final   07/25/2022 26 0 - 40 U/L Final     GFR Non-   Date Value Ref Range Status   07/27/2022 48 >=60 mL/min/1.73 Final     Comment:     Chronic Kidney Disease: less than 60 ml/min/1.73 sq.m. Kidney Failure: less than 15 ml/min/1.73 sq.m. Results valid for patients 18 years and older. 07/26/2022 44 >=60 mL/min/1.73 Final     Comment:     Chronic Kidney Disease: less than 60 ml/min/1.73 sq.m. Kidney Failure: less than 15 ml/min/1.73 sq.m. Results valid for patients 18 years and older. 07/25/2022 44 >=60 mL/min/1.73 Final     Comment:     Chronic Kidney Disease: less than 60 ml/min/1.73 sq.m. Kidney Failure: less than 15 ml/min/1.73 sq.m. Results valid for patients 18 years and older. GFR    Date Value Ref Range Status   07/27/2022 58  Final   07/26/2022 53  Final   07/25/2022 53  Final     Magnesium   Date Value Ref Range Status   07/25/2022 2.1 1.6 - 2.6 mg/dL Final   07/24/2022 2.2 1.6 - 2.6 mg/dL Final   07/23/2022 2.2 1.6 - 2.6 mg/dL Final     Phosphorus   Date Value Ref Range Status   07/25/2022 2.7 2.5 - 4.5 mg/dL Final   07/24/2022 3.4 2.5 - 4.5 mg/dL Final   07/23/2022 3.5 2.5 - 4.5 mg/dL Final     No results for input(s): PH, PO2, PCO2, HCO3, BE, O2SAT in the last 72 hours. RADIOLOGY:  XR CHEST 1 VIEW   Final Result   1. No pneumothorax. 2. Bibasilar opacities notable on the left related to pneumonia, atelectasis,   and probable pleural effusions.          XR CHEST PORTABLE Final Result   No significant interval change of the past 24 hours. XR CHEST PORTABLE   Final Result   No significant changes since the previous study of July 24. XR CHEST PORTABLE   Final Result   No significant changes since the July 23. XR CHEST PORTABLE   Final Result   Persistent findings of a decompensated congestive heart failure. No   significant changes since July 21st.         CT Head WO Contrast   Final Result   1. No acute intracranial abnormality. 2. Chronic small vessel ischemic disease and generalized cerebral volume loss. 3. Rather diffuse scalp swelling, right greater than left. XR CHEST PORTABLE   Final Result   Cardiomegaly with pulmonary edema and bilateral small pleural effusions   suggestive of congestive heart failure acute exacerbation. Superimposed   pneumonia, particularly in the left lung base is not excluded. IR GUIDED THORACENTESIS PLEURAL    (Results Pending)           PROBLEM LIST:  Principal Problem:    Diastolic CHF, acute ([address]  Active Problems:    Acute pulmonary edema (HCC)  Resolved Problems:    * No resolved hospital problems.  *      IMPRESSION:  HFrEF  HFpEF  Right heart failure/cor pulmonale  Status post left thoracentesis with fluid consistent with a transudate most likely from heart failure  Atrial fibrillation  Stasis dermatitis  CA, improving  Probable element of chronic kidney disease    PLAN:  Patient has improved a great deal from the cardiac and pulmonary standpoint  We will sign off but be available for any questions or issues that may arise        Electronically signed by Blessing Mcgregor MD on 7/27/2022 at 1:46 PM

## 2022-07-27 NOTE — CARE COORDINATION
SOCIAL WORK / DISCHARGE PLANNING:   Sw called [facility] [phone], left voice message for Admissions, Caryle Husky to follow up on referral that was made, inquiring if accepted. PRECERT needed prior to dc. If not accepted will need to inquiry with family alternate TERENCE choices in [facility]. Po Augmentin, 1L O2. VALERIA will need signed by physician. HENS will need completed prior to dc. Addendum: 1242pm Pt accepted to [facility], spoke with Fabiola Gilmore, reviewed current MAR with her. She requested PT note to start [address] and Sw inquired if pt is CLICK 6 PRECERT eligible. She will review and let this Sw know if can be CLICK 6 Eligible. Addendum; 212pm Discussed case with Dr Varsha Juarez, not ready for dc today, updated [facility] Admission, will start [address]. Updated son, Jenna Orr via phone, answered all of his questions about long term and [facility]. Addendum: 421pm - HENs form initiated, will need completed when dc order is obtained.      Electronically signed by LETY Memberno on 7/27/2022 at 9:54 AM

## 2022-07-28 VITALS
HEART RATE: 88 BPM | OXYGEN SATURATION: 93 % | SYSTOLIC BLOOD PRESSURE: 111 MMHG | RESPIRATION RATE: 20 BRPM | WEIGHT: 260 LBS | DIASTOLIC BLOOD PRESSURE: 70 MMHG | HEIGHT: 72 IN | TEMPERATURE: 98.7 F | BODY MASS INDEX: 35.21 KG/M2

## 2022-07-28 PROBLEM — J96.01 ACUTE RESPIRATORY FAILURE WITH HYPOXIA (HCC): Status: ACTIVE | Noted: 2022-07-28

## 2022-07-28 PROBLEM — I48.91 ATRIAL FIBRILLATION (HCC): Status: ACTIVE | Noted: 2022-07-28

## 2022-07-28 PROBLEM — N17.9 AKI (ACUTE KIDNEY INJURY) (HCC): Status: ACTIVE | Noted: 2022-07-28

## 2022-07-28 LAB
ALBUMIN SERPL-MCNC: 2.9 G/DL (ref 3.5–5.2)
ALP BLD-CCNC: 105 U/L (ref 40–129)
ALT SERPL-CCNC: 20 U/L (ref 0–40)
ANION GAP SERPL CALCULATED.3IONS-SCNC: 7 MMOL/L (ref 7–16)
AST SERPL-CCNC: 28 U/L (ref 0–39)
BILIRUB SERPL-MCNC: 0.7 MG/DL (ref 0–1.2)
BODY FLUID CULTURE, STERILE: NORMAL
BUN BLDV-MCNC: 30 MG/DL (ref 6–23)
CALCIUM SERPL-MCNC: 7.6 MG/DL (ref 8.6–10.2)
CHLORIDE BLD-SCNC: 105 MMOL/L (ref 98–107)
CO2: 29 MMOL/L (ref 22–29)
CREAT SERPL-MCNC: 1.4 MG/DL (ref 0.7–1.2)
GFR AFRICAN AMERICAN: 58
GFR NON-AFRICAN AMERICAN: 48 ML/MIN/1.73
GLUCOSE BLD-MCNC: 87 MG/DL (ref 74–99)
GRAM STAIN RESULT: NORMAL
POTASSIUM SERPL-SCNC: 4.3 MMOL/L (ref 3.5–5)
SARS-COV-2, NAAT: NOT DETECTED
SODIUM BLD-SCNC: 141 MMOL/L (ref 132–146)
TOTAL PROTEIN: 5.4 G/DL (ref 6.4–8.3)

## 2022-07-28 PROCEDURE — 6370000000 HC RX 637 (ALT 250 FOR IP): Performed by: INTERNAL MEDICINE

## 2022-07-28 PROCEDURE — 36415 COLL VENOUS BLD VENIPUNCTURE: CPT

## 2022-07-28 PROCEDURE — 6360000002 HC RX W HCPCS: Performed by: INTERNAL MEDICINE

## 2022-07-28 PROCEDURE — 80053 COMPREHEN METABOLIC PANEL: CPT

## 2022-07-28 PROCEDURE — P9047 ALBUMIN (HUMAN), 25%, 50ML: HCPCS | Performed by: INTERNAL MEDICINE

## 2022-07-28 PROCEDURE — 97110 THERAPEUTIC EXERCISES: CPT

## 2022-07-28 PROCEDURE — 87635 SARS-COV-2 COVID-19 AMP PRB: CPT

## 2022-07-28 PROCEDURE — 99239 HOSP IP/OBS DSCHRG MGMT >30: CPT | Performed by: STUDENT IN AN ORGANIZED HEALTH CARE EDUCATION/TRAINING PROGRAM

## 2022-07-28 PROCEDURE — 2580000003 HC RX 258: Performed by: INTERNAL MEDICINE

## 2022-07-28 PROCEDURE — 97530 THERAPEUTIC ACTIVITIES: CPT

## 2022-07-28 PROCEDURE — 2700000000 HC OXYGEN THERAPY PER DAY

## 2022-07-28 RX ORDER — FUROSEMIDE 40 MG/1
40 TABLET ORAL DAILY
Qty: 60 TABLET | Refills: 0 | DISCHARGE
Start: 2022-07-28

## 2022-07-28 RX ORDER — AMOXICILLIN AND CLAVULANATE POTASSIUM 500; 125 MG/1; MG/1
1 TABLET, FILM COATED ORAL EVERY 12 HOURS SCHEDULED
Qty: 12 TABLET | Refills: 0 | DISCHARGE
Start: 2022-07-28 | End: 2022-08-03

## 2022-07-28 RX ORDER — CARVEDILOL 3.12 MG/1
3.12 TABLET ORAL 2 TIMES DAILY WITH MEALS
Qty: 60 TABLET | Refills: 0 | DISCHARGE
Start: 2022-07-28

## 2022-07-28 RX ORDER — HYDRALAZINE HYDROCHLORIDE 25 MG/1
25 TABLET, FILM COATED ORAL EVERY 8 HOURS SCHEDULED
Qty: 90 TABLET | Refills: 0 | DISCHARGE
Start: 2022-07-28

## 2022-07-28 RX ORDER — PANTOPRAZOLE SODIUM 40 MG/1
40 TABLET, DELAYED RELEASE ORAL
Qty: 30 TABLET | Refills: 0 | DISCHARGE
Start: 2022-07-29

## 2022-07-28 RX ORDER — LACTOBACILLUS RHAMNOSUS GG 10B CELL
1 CAPSULE ORAL DAILY
DISCHARGE
Start: 2022-07-29

## 2022-07-28 RX ORDER — AMIODARONE HYDROCHLORIDE 200 MG/1
200 TABLET ORAL DAILY
DISCHARGE
Start: 2022-07-29

## 2022-07-28 RX ADMIN — APIXABAN 2.5 MG: 2.5 TABLET, FILM COATED ORAL at 09:39

## 2022-07-28 RX ADMIN — PANTOPRAZOLE SODIUM 40 MG: 40 TABLET, DELAYED RELEASE ORAL at 06:19

## 2022-07-28 RX ADMIN — HYDRALAZINE HYDROCHLORIDE 25 MG: 25 TABLET, FILM COATED ORAL at 06:17

## 2022-07-28 RX ADMIN — ALBUMIN (HUMAN) 25 G: 0.25 INJECTION, SOLUTION INTRAVENOUS at 11:38

## 2022-07-28 RX ADMIN — FUROSEMIDE 20 MG: 10 INJECTION, SOLUTION INTRAMUSCULAR; INTRAVENOUS at 09:43

## 2022-07-28 RX ADMIN — CARVEDILOL 3.12 MG: 3.12 TABLET, FILM COATED ORAL at 09:39

## 2022-07-28 RX ADMIN — AMOXICILLIN AND CLAVULANATE POTASSIUM 1 TABLET: 500; 125 TABLET, FILM COATED ORAL at 09:48

## 2022-07-28 RX ADMIN — HYDRALAZINE HYDROCHLORIDE 25 MG: 25 TABLET, FILM COATED ORAL at 15:32

## 2022-07-28 RX ADMIN — Medication 1 CAPSULE: at 09:39

## 2022-07-28 RX ADMIN — Medication 10 ML: at 11:56

## 2022-07-28 RX ADMIN — HEPARIN 100 UNITS: 100 SYRINGE at 09:44

## 2022-07-28 RX ADMIN — AMIODARONE HYDROCHLORIDE 200 MG: 200 TABLET ORAL at 09:39

## 2022-07-28 ASSESSMENT — PAIN SCALES - GENERAL
PAINLEVEL_OUTOF10: 0
PAINLEVEL_OUTOF10: 0

## 2022-07-28 NOTE — CARE COORDINATION
SOCIAL WORK / DISCHARGE PLANNING:   WILL NEED A RAPID COVID DAY OF DC. PRECERT obtained for Dc plan to [facility] in [place], fax [phone]. VALERIA will need signed by physician. HENS form initiated, will need completed when dc order is obtained. ADDENDUM: 1157am RAPID TO BE COLLECTED. Dc to [facility] [phone], fax [phone]. Transport arranged via [address] [place] [address],[address] RN charge aware. HENS form completed. Andery updated Mala at [facility]. Andrey notified son, Harleen Chacko via phone of dc and time of transport.          Electronically signed by LETY Contreras on 7/28/2022 at 10:52 AM

## 2022-07-28 NOTE — PROGRESS NOTES
Physical Therapy    Physical Therapy Treatment Note/Plan of Care    Room #:  4134/0447-96  Patient Name: Barabara Gaucher  YOB: 1933  MRN: [id number]    Date of Service: 7/28/2022     Tentative placement recommendation: Subacute rehab  Equipment recommendation: To be determined      Evaluating Physical Therapist: Amadeo Grace  #[id number]      Specific Provider Orders/Date/Referring Provider :  07/23/22 1315    PT eval and treat  Start:  07/23/22 1315,   End:  07/23/22 1315,   ONE TIME,   Standing Count:  1 Occurrences,   Bertha Craig MD     Admitting Diagnosis:   Acute pulmonary edema ([address] [K22.1]  Diastolic CHF, acute ([address] [I50.31]  Acute respiratory failure with hypoxia ([address] [J96.01]  Atrial fibrillation, unspecified type ([address] [I48.91]    Admitted with  fall at home unresponsive   Surgery: none  Visit Diagnoses         Codes    Acute pulmonary edema ([address]    -  Primary J81.0    Acute respiratory failure with hypoxia ([address]     J96.01    Atrial fibrillation, unspecified type ([address]     I48.91    Postprocedural pneumothorax     J95.811    CA (acute kidney injury) ([address]     N17.9            Patient Active Problem List   Diagnosis    Diastolic CHF, acute ([address]    Acute pulmonary edema (HCC)    Acute respiratory failure with hypoxia (HCC)    CA (acute kidney injury) ([address]    Atrial fibrillation ([address]        ASSESSMENT of Current Deficits Patient exhibits decreased strength, balance, and endurance impairing functional mobility, transfers, gait , gait distance, and tolerance to activity are barriers to d/c and require skilled intervention during hospital stay to attain pre hospital level of function. Decreased strength, balance and endurance  increases patient's risk for fall. Patient tolerates inc activity this session and able to sit on the edge of bed x15min.  Patient attempted standing to wheeled walker with bed elevated and Max assist, however patient too deconditioned and unable to off weight bottom from side of bed. Patient required Mod assist x2 for sit to supine position. Patient would benefit from cont therapy due to deconditioning and to decrease risk of falls. PHYSICAL THERAPY  PLAN OF CARE       Physical therapy plan of care is established based on physician order,  patient diagnosis and clinical assessment    Current Treatment Recommendations:    -Bed Mobility: Lower extremity exercises , Upper extremity exercises , and Trunk control activities   -Sitting Balance: Incorporate reaching activities to activate trunk muscles , Facilitate active trunk muscle engagement , and Facilitate postural control in all planes   -Standing Balance: Perform strengthening exercises in standing to promote motor control with or without upper extremity support  and Instruct patient on adequate base of support to maintain balance  -Transfers: Provide instruction on proper hand and foot position for adequate transfer of weight onto lower extremities and use of gait device if needed and Cues for hand placement, technique and safety. Provide stabilization to prevent fall   -Gait: Gait training and Standing activities to improve: base of support, weight shift, weight bearing    -Endurance: Utilize Supervised activities to increase level of endurance to allow for safe functional mobility including transfers and gait     PT long term treatment goals are located in below grid    Patient and or family understand(s) diagnosis, prognosis, and plan of care. Frequency of treatments: Patient will be seen  daily. Prior Level of Function: Patient ambulated independently    Rehab Potential: good   for baseline    Past medical history:   No past medical history on file. No past surgical history on file.     SUBJECTIVE:    Precautions: , falls, alarm, and O2     Social history: Patient lives alone in a ranch home  with 10 steps  to enter with Rail  Sponge bathes grab bars    Equipment owned: None,       AM-PAC Basic Mobility        AM-PAC Mobility Inpatient   How much difficulty turning over in bed?: A Lot  How much difficulty sitting down on / standing up from a chair with arms?: Unable  How much difficulty moving from lying on back to sitting on side of bed?: A Lot  How much help from another person moving to and from a bed to a chair?: Total  How much help from another person needed to walk in hospital room?: Total  How much help from another person for climbing 3-5 steps with a railing?: Total  AM-PAC Inpatient Mobility Raw Score : 8  AM-PAC Inpatient T-Scale Score : 28.52  Mobility Inpatient CMS 0-100% Score: 86.62  Mobility Inpatient CMS G-Code Modifier : CM    Nursing cleared patient for PT treatment. OBJECTIVE;   Initial Evaluation  Date: 7/24/2022 Treatment Date:  7/28/2022       Short Term/ Long Term   Goals   Was pt agreeable to Eval/treatment? Yes yes To be met in 5 days   Pain level   0/10    0/10    Bed Mobility    Rolling: Moderate assist of 1    Supine to sit: Maximal assist of 1    Sit to supine: Maximal assist of 1    Scooting: Maximal assist of 1   Rolling: Maximal assist of 1   Supine to sit: Maximal assist of 1 using TAPS  Sit to supine:  Moderate assist of  2   Scooting: Maximal assist of 1 using TAPS   Rolling: Minimal assist of 1    Supine to sit: Minimal assist of 1    Sit to supine: Minimal assist of 1    Scooting: Minimal assist of 1     Transfers Sit to stand: Maximal assist of 1 with full upright 3rd attempt Sit to stand: Not assessed    Attempted but patient unable     Sit to stand: Minimal assist of 1     Ambulation    not assessed  not assessed      20 feet using  wheeled walker with Moderate assist of 1    ROM impaired d/t swelling  Increase range of motion 10% of affected joints    Strength BUE:   3+/5  RLE:  3/5  LLE:  3/5  Increase strength in affected mm groups by 1/3 grade   Balance Sitting EOB:  fair    Dynamic Standing:  poor   Sitting EOB: fair +   Dynamic Standing: not assessed    Sitting EOB:  good    Dynamic Standing: fair with wheeled walker      Patient is Alert & Oriented x person, place, time, and situation and follows directions    Sensation:  Patient  denies numbness/tingling   Edema:  yes bilateral lower extremities weeping Left lower extremity   Endurance: poor      Vitals:  2 liters nasal cannula   Blood Pressure at rest  Blood Pressure during session    Heart Rate at rest  Heart Rate during session    SPO2 at rest 90% on room air, O2 donned and 91% SPO2 during session 92-96%     Patient education  Patient educated on role of Physical Therapy, risks of immobility, safety and plan of care, energy conservation,  importance of mobility while in hospital , importance and purpose of adaptive device and adjusted to proper height for the patient. , safety , and seated exercises. Patient response to education:   Pt verbalized understanding Pt demonstrated skill Pt requires further education in this area   Yes Partial Yes      Treatment:  Patient practiced and was instructed/facilitated in the following treatment: Patient assisted to side of bed and sat edge of bed x15min. Patient performed seated exercises. Patient attempted standing, however unable to stand and assisted back to supine position in bed. Therapeutic Exercises:  ankle pumps, hip abduction/adduction, and straight leg raise,  x 10-15 reps. At end of session, patient in bed with  wound care nurse present  call light and phone within reach,  all lines and tubes intact, nursing notified. Patient would benefit from continued skilled Physical Therapy to improve functional independence and quality of life.          Patient's/ family goals   get stronger    Time in  1034  Time out 1107    Total Treatment Time  33 minutes    CPT codes:  Therapeutic activities (52950)   23 minutes  1 unit(s)  Therapeutic exercises (54661)   10 minutes  1 unit(s)    Earline Kirkpatrick, \[facility]\""  #[id number]

## 2022-07-28 NOTE — DISCHARGE INSTRUCTIONS
HEART FAILURE  / CONGESTIVE HEART FAILURE  DISCHARGE INSTRUCTIONS:  GUIDELINES TO FOLLOW AT HOME    No future appointments. Self- Managed Care:     MEDICATIONS:  Take your medication as directed. If you are experiencing any side effects, inform your doctor, Do not stop taking any of your medications without letting your doctor know. Check with your doctor before taking any over-the-counter medications / herbal / or dietary supplements. They may interfere with your other medications. Do not take ibuprofen (Advil or Motrin) and naproxen (Aleve) without talking to your doctor first. They could make your heart failure worse. WEIGHT MONITORING:   Weigh yourself everyday (with the same scale) around the same time of the day and write it down. (you can chart them on a calendar or keep track of them on paper. Notify your doctor of a weight gain of 3 pounds or more in 1 day   OR a total of 5 pounds or more in 1 week    Take your weight record to your doctor visits  Also, the same goes if you loose more than 3# in one day, let your heart doctor know. DIET:   Cardiac heart healthy diet- Low saturated / low trans fat, no added salt, caffeine restricted, Low sodium diet-   No more than 2,000mg (2 grams) of salt / sodium per day (which equals to a little less than  a teaspoon of salt)  If your doctor wants you on a fluid restriction. ..it is usually recommended a fluid limit of 2,000cc -  Fluid restriction- 2,000 ml (milliliters) = 64 ounces = you can have 8 glasses of fluid per day (each glass 8 ounces)    Follow a low salt diet - avoid using salt at the table, avoid / limit use of canned soups, processed / packaged foods, salted snacks, olives and pickles. Do not use a salt substitute without checking with your doctor, they may contain a high amount of potassioum. (Mrs. Sailaja Stewart is safe to use).     Limit the use of alcohol       CALL YOUR DOCTOR THE FIRST DAY YOU NOTICE ANY OF THESE one before, ask your doctor whether you need another dose. My Goal for Self-management of Heart Failure Includes 5 steps :    1. Notice a change in symptoms ( weight gain, short of breath, leg swelling, decreased activity level, bloating. ...)    2. Evaluate the change: (use the Heart Failure Zones )     3. Decide to take action: decide what your options are, such as: (call your doctor for an extra visit, take a prescribed medication, such as your water pill if your doctor has given you directions to do so, [address])    4. Come up with a strategy:  (now you call the doctor for advice / appointment. This is where you take action!!! Do not wait, catch the symptom early and treat it before it worsens. 5. Evaluate the response: The next day, check your Heart Failure Zones: are you in the GREEN ZONE (safe zone)? Worsening symptoms of YELLOW ZONE? Or have you moved to the RED ZONE and need to call 911 or go to the Emergency Room for evaluation? Call your doctor's office to update them on your symptoms of heart failure. Learning About Heart Failure Zones  What are heart failure zones? Heart failure zones give you an easy way to see changes in your heart failure symptoms. They also tell you when you need to get help. Check every day to see which zone you are in. Green zone. You are doing well. This is where you want to be. Your weight is stable. It's not going up or down. You breathe easily. You are sleeping well. You are able to lie flat without shortness of breath. You can do your usual activities. Yellow zone. Be careful. Your symptoms are changing. Call your doctor. You have new or increased shortness of breath. You are dizzy or lightheaded, or you feel like you may faint. You have sudden weight gain, such as more than 2 to 3 pounds in a day or 5 pounds in a week.  (Your doctor may suggest a different range of weight gain.)  You have increased swelling in your legs, ankles, or feet.  You are so tired or weak that you can't do your usual activities. You are not sleeping well. Shortness of breath wakes you up at night. You need extra pillows. Red zone. 911  This is an emergency. Call . You have symptoms of sudden heart failure. For example: You have severe trouble breathing. You cough up pink, foamy mucus. You have a new irregular or fast heartbeat. You have symptoms of a heart attack. These may include:  Chest pain or pressure, or a strange feeling in the chest.  Sweating. Shortness of breath. Nausea or vomiting. Pain, pressure, or a strange feeling in the back, neck, jaw, or upper belly or in one or both shoulders or arms. Lightheadedness or sudden weakness. A fast or irregular heartbeat. If you have symptoms of a heart attack 911 : After you call , the  may tell you to chew 1 adult-strength or 2 to 4 low-dose aspirin. Wait for an ambulance. Do not try to drive yourself. Follow-up care is a key part of your treatment and safety. Be sure to make and go to all appointments, and call your doctor if you are having problems. It's also a good idea to know your test results and keep a list of the medicines you take. Where can you learn more? Go to [email or web address] org and sign in to your [employer] account. Enter T174 in the [facility] box to learn more about \"Learning About Heart Failure Zones. \"     If you do not have an account, please click on the \"Sign Up Now\" link. Current as of: August 31, 2020               Content Version: 12.9  © 6084-9160 [employer]wise, [employer]. Care instructions adapted under license by [facility] [employer] [facility] ([facility]). If you have questions about a medical condition or this instruction, always ask your healthcare professional. [address] any warranty or liability for your use of this information.

## 2022-07-28 NOTE — FLOWSHEET NOTE
Inpatient Wound Care    Admit Date: 7/21/2022  1:41 PM    Reason for consult:  SKIN CARE    Significant history:  No past medical history on file.     Wound history:      Findings:     07/28/22 1111   Skin Integumentary    Skin Integumentary (WDL) X   Location BLE   Skin Condition/Temp Flaky;Dry   Dressing Present  Yes   Skin Assessed Underneath Dressing This Shift Yes   Skin Integrity Site 2   Skin Integrity Location 2 Tear   Location 2 MID BACK   Wound 07/24/22 Back Medial;Upper skin tear   Date First Assessed/Time First Assessed: 07/24/22 0630   Present on Hospital Admission: (c) Yes  Location: Back  Wound Location Orientation: Medial;Upper  Wound Description (Comments): skin tear   Wound Etiology Skin Tear   Wound Cleansed Cleansed with saline   Wound Length (cm) 7 cm   Wound Width (cm) 4 cm   Wound Depth (cm) 0.2 cm   Wound Surface Area (cm^2) 28 cm^2   Change in Wound Size % (l*w) 30   Wound Volume (cm^3) 5.6 cm^3   Wound Assessment Pink/red   Drainage Amount Small   Odor None   Wound Thickness Description not for Pressure Injury Partial thickness       Impression:  back skin tear      Interventions in place:  mepilex to back  Mepilex to heels    Plan:  Cont treatment      Delores Madden RN 7/28/2022 11:18 AM

## 2022-07-28 NOTE — DISCHARGE SUMMARY
[facility] Physician Discharge Summary       [facility]  [address] [address] [address] [phone]            Activity level: as tolerated    Diet: ADULT DIET; Regular; 4 carb choices (60 gm/meal); Low Fat/Low Chol/High Fiber/2 gm Na; Mildly Thick (Nectar); 1800 ml  ADULT ORAL NUTRITION SUPPLEMENT; Lunch, Dinner; Fortified Pudding Oral Supplement    Labs:as per PCP    Dispo:SNF    Condition at discharge: Stable      Patient ID:  Sallie Calhoun  [id number]  49 y.o.  8/25/1933    Admit date: 7/21/2022    Discharge date and time:  7/28/2022  11:31 AM    Admission Diagnoses: Principal Problem:    Diastolic CHF, acute ([address]  Active Problems:    Acute pulmonary edema ([address]  Resolved Problems:    * No resolved hospital problems. *      Discharge Diagnoses: Principal Problem:    Diastolic CHF, acute ([address]  Active Problems:    Acute pulmonary edema (HCC)  Resolved Problems:    * No resolved hospital problems. *      Consults:  IP CONSULT TO CARDIOLOGY  IP CONSULT TO CRITICAL CARE    Procedures: L sided thoracentesis on 7/26/22    Hospital Course: Patient was admitted with acute respiratory failure due to CHF exacerbation. Initially patient required to be in ICU as he was unresponsive and required nitro drip initially for decompensated CHF. Cardiology was consulted. He was also treated with NIV, lasix and also underwent thoracentesis where 320cc fluid was drained which appeared to be transudative per labs. Echo was done which showed EF 40%. Eventually he was able to be weaned off oxygen and NIV and was doing well. He worked with PT/OT who advised [facility]. Referrals were made and patient was accepted and transferred there in stable condition.      Discharge Exam:  Vitals:    07/28/22 0000 07/28/22 0600 07/28/22 0617 07/28/22 0815   BP: 132/64  130/80 96/78   Pulse: 84   88   Resp: 25   20   Temp: 98.1 °F (36.7 °C)   98.2 °F (36.8 °C)   TempSrc: Oral SpO2: 98%   97%   Weight:  260 lb (117.9 kg)     Height:           General Appearance: alert and oriented to person, place and time, well developed and well- nourished, in no acute distress  Skin: warm and dry, no rash or erythema  Head: normocephalic and atraumatic  Eyes: pupils equal, round, and reactive to light, extraocular eye movements intact, conjunctivae normal  Pulmonary/Chest: slight crackles at bases bilaterally, normal air movement, no respiratory distress on RA  Cardiovascular: normal rate, irregular rhythm, normal S1 and S2, no murmurs, rubs, clicks, or gallops, distal pulses intact, no carotid bruits  Abdomen: soft, non-tender, non-distended, normal bowel sounds, no masses or organomegaly  Extremities: no cyanosis, clubbing. 1+ pitting edema b/l LE with chronic venous stasis changes   Neurologic: no cranial nerve deficit and speech normal      I/O last 3 completed shifts: In: 445 [P.O.:430; I.V.:15]  Out: 3150 [Urine:3150]  No intake/output data recorded. LABS:  Recent Labs     07/26/22  0522 07/27/22  0740 07/28/22  0705    141 141   K 4.0 4.0 4.3    107 105   CO2 28 28 29   BUN 42* 32* 30*   CREATININE 1.5* 1.4* 1.4*   GLUCOSE 99 89 87   CALCIUM 7.4* 7.1* 7.6*       Recent Labs     07/26/22  0522   WBC 7.4   RBC 4.00   HGB 11.8*   HCT 38.0   MCV 95.0   MCH 29.5   MCHC 31.1*   RDW 16.1*      MPV 9.9       No results for input(s): POCGLU in the last 72 hours. Imaging:   XR CHEST 1 VIEW   Final Result   1. No pneumothorax. 2. Bibasilar opacities notable on the left related to pneumonia, atelectasis,   and probable pleural effusions. IR GUIDED THORACENTESIS PLEURAL   Final Result   Successful ultrasound guided thoracentesis. XR CHEST PORTABLE   Final Result   No significant interval change of the past 24 hours. XR CHEST PORTABLE   Final Result   No significant changes since the previous study of July 24.          XR CHEST PORTABLE   Final Result No significant changes since the July 23. XR CHEST PORTABLE   Final Result   Persistent findings of a decompensated congestive heart failure. No   significant changes since July 21st.         CT Head WO Contrast   Final Result   1. No acute intracranial abnormality. 2. Chronic small vessel ischemic disease and generalized cerebral volume loss. 3. Rather diffuse scalp swelling, right greater than left. XR CHEST PORTABLE   Final Result   Cardiomegaly with pulmonary edema and bilateral small pleural effusions   suggestive of congestive heart failure acute exacerbation. Superimposed   pneumonia, particularly in the left lung base is not excluded. Patient Instructions:      Medication List        START taking these medications      amiodarone 200 MG tablet  Commonly known as: CORDARONE  Take 1 tablet by mouth in the morning. Start taking on: July 29, 2022     amoxicillin-clavulanate 500-125 MG per tablet  Commonly known as: AUGMENTIN  Take 1 tablet by mouth in the morning and 1 tablet before bedtime. Do all this for 6 days. apixaban 2.5 MG Tabs tablet  Commonly known as: ELIQUIS  Take 1 tablet by mouth in the morning and 1 tablet before bedtime. carvedilol 3.125 MG tablet  Commonly known as: COREG  Take 1 tablet by mouth in the morning and 1 tablet in the evening. Take with meals. furosemide 40 MG tablet  Commonly known as: Lasix  Take 1 tablet by mouth in the morning. hydrALAZINE 25 MG tablet  Commonly known as: APRESOLINE  Take 1 tablet by mouth in the morning and 1 tablet at noon and 1 tablet before bedtime. lactobacillus Caps capsule  Take 1 capsule by mouth in the morning.   Start taking on: July 29, 2022     pantoprazole 40 MG tablet  Commonly known as: PROTONIX  Take 1 tablet by mouth every morning (before breakfast)  Start taking on: July 29, 2022               Where to Get Your Medications        Information about where to get these medications is not yet available    Ask your nurse or doctor about these medications  amiodarone 200 MG tablet  amoxicillin-clavulanate 500-125 MG per tablet  apixaban 2.5 MG Tabs tablet  carvedilol 3.125 MG tablet  furosemide 40 MG tablet  hydrALAZINE 25 MG tablet  lactobacillus Caps capsule  pantoprazole 40 MG tablet           Note that more than 30 minutes was spent in preparing discharge papers, discussing discharge with patient, medication review, etc.    Signed:  Electronically signed by Vannesa Redmond MD on 7/28/2022 at 11:31 AM    NOTE: This report was transcribed using voice recognition software. Every effort was made to ensure accuracy; however, inadvertent computerized transcription errors may be present.

## 2022-07-28 NOTE — PLAN OF CARE
Problem: Skin/Tissue Integrity  Goal: Absence of new skin breakdown  Description: 1. Monitor for areas of redness and/or skin breakdown  2. Assess vascular access sites hourly  3. Every 4-6 hours minimum:  Change oxygen saturation probe site  4. Every 4-6 hours:  If on nasal continuous positive airway pressure, respiratory therapy assess nares and determine need for appliance change or resting period.   7/28/2022 0417 by Rosita Morgan RN  Outcome: Progressing     Problem: ABCDS Injury Assessment  Goal: Absence of physical injury  7/28/2022 0417 by Rosita Morgan RN  Outcome: Progressing     Problem: Safety - Adult  Goal: Free from fall injury  7/28/2022 0417 by Rosita Morgan RN  Outcome: Progressing     Problem: Pain  Goal: Verbalizes/displays adequate comfort level or baseline comfort level  Outcome: Progressing